# Patient Record
Sex: FEMALE | Race: BLACK OR AFRICAN AMERICAN | NOT HISPANIC OR LATINO | URBAN - METROPOLITAN AREA
[De-identification: names, ages, dates, MRNs, and addresses within clinical notes are randomized per-mention and may not be internally consistent; named-entity substitution may affect disease eponyms.]

---

## 2021-04-21 ENCOUNTER — OFFICE VISIT (OUTPATIENT)
Dept: OBSTETRICS AND GYNECOLOGY | Facility: CLINIC | Age: 26
End: 2021-04-21

## 2021-04-21 VITALS
DIASTOLIC BLOOD PRESSURE: 64 MMHG | BODY MASS INDEX: 25.3 KG/M2 | SYSTOLIC BLOOD PRESSURE: 118 MMHG | HEIGHT: 64 IN | WEIGHT: 148.2 LBS

## 2021-04-21 DIAGNOSIS — B37.9 YEAST INFECTION: ICD-10-CM

## 2021-04-21 DIAGNOSIS — N89.8 VAGINAL IRRITATION: ICD-10-CM

## 2021-04-21 DIAGNOSIS — Z11.3 SCREENING FOR STDS (SEXUALLY TRANSMITTED DISEASES): Primary | ICD-10-CM

## 2021-04-21 LAB
KOH PREP NAIL: ABNORMAL
WET PREP GENITAL: NORMAL

## 2021-04-21 PROCEDURE — 87210 SMEAR WET MOUNT SALINE/INK: CPT | Performed by: NURSE PRACTITIONER

## 2021-04-21 PROCEDURE — 87220 TISSUE EXAM FOR FUNGI: CPT | Performed by: NURSE PRACTITIONER

## 2021-04-21 PROCEDURE — 99213 OFFICE O/P EST LOW 20 MIN: CPT | Performed by: NURSE PRACTITIONER

## 2021-04-21 RX ORDER — FLUCONAZOLE 150 MG/1
TABLET ORAL
Qty: 2 TABLET | Refills: 0 | Status: SHIPPED | OUTPATIENT
Start: 2021-04-21 | End: 2022-12-01

## 2021-04-21 RX ORDER — VALACYCLOVIR HYDROCHLORIDE 1 G/1
1000 TABLET, FILM COATED ORAL DAILY
Qty: 30 TABLET | Refills: 6 | Status: SHIPPED | OUTPATIENT
Start: 2021-04-21 | End: 2022-12-01

## 2021-04-21 NOTE — PROGRESS NOTES
Chief Complaint   Patient presents with   • STD screening       Subjective   HPI  Marie Overton is a 26 y.o. female, , who presents for STD screening.    She reports that she is currently having some mild vaginal irritation. She reports that she recently had intercourse, and the condom broke.      She denies abnormal vaginal discharge,odor, or soaps/detergants/lotions.       Her last LMP was No LMP recorded (lmp unknown). Patient has had an implant..  Periods are absent, secondary to nexplanon. Partner Status: Marital Status: single.  New Partners since last visit: yes.  Desires STD Screening: yes.    She would also like refills on valacyclovir.    Additional OB/GYN History   Current contraception: contraceptive methods: Nexplanon  Desires to: continue contraception  Last Pap : 2020  Last Completed Pap Smear       Status Date      PAP SMEAR No completions recorded        History of abnormal Pap smear: yes - HR HPV Non 16/18  Tobacco Usage?: No   OB History        0    Para   0    Term   0       0    AB   0    Living   0       SAB   0    TAB   0    Ectopic   0    Molar   0    Multiple   0    Live Births   0                Health Maintenance   Topic Date Due   • Annual Gynecologic Pelvic and Breast Exam  Never done   • ANNUAL PHYSICAL  Never done   • Pneumococcal Vaccine 0-64 (1 of 1 - PPSV23) Never done   • HPV VACCINES (1 - 2-dose series) Never done   • COVID-19 Vaccine (1) Never done   • TDAP/TD VACCINES (1 - Tdap) Never done   • HEPATITIS C SCREENING  Never done   • CHLAMYDIA SCREENING  Never done   • PAP SMEAR  Never done   • INFLUENZA VACCINE  2021       The additional following portions of the patient's history were reviewed and updated as appropriate: allergies, current medications, past family history, past medical history, past social history, past surgical history and problem list.    Review of Systems   Constitutional: Negative.    HENT: Negative.    Respiratory: Negative.   "  Cardiovascular: Negative.    Gastrointestinal: Negative.    Genitourinary:        Vag irritation   Musculoskeletal: Negative.    Skin: Negative.    Allergic/Immunologic: Negative.    Neurological: Negative.    Hematological: Negative.    Psychiatric/Behavioral: Negative.        I have reviewed and agree with the HPI, ROS, and historical information as entered above. Paloma Lei, APRN    Objective   /64   Ht 162.6 cm (64\")   Wt 67.2 kg (148 lb 3.2 oz)   LMP  (LMP Unknown)   Breastfeeding No   BMI 25.44 kg/m²     Physical Exam  Vitals and nursing note reviewed. Exam conducted with a chaperone present.   Constitutional:       Appearance: Normal appearance.   HENT:      Head: Normocephalic and atraumatic.   Pulmonary:      Effort: Pulmonary effort is normal.   Genitourinary:     Labia:         Right: No rash, tenderness or lesion.         Left: No rash, tenderness or lesion.       Vagina: Normal. No lesions.      Cervix: Discharge (white, thick) present. No cervical motion tenderness, lesion or cervical bleeding.      Uterus: Normal. Not enlarged, not fixed and not tender.       Adnexa:         Right: No mass or tenderness.          Left: No mass or tenderness.        Rectum: No external hemorrhoid.      Comments: Chaperone Present  Neurological:      Mental Status: She is alert and oriented to person, place, and time.   Psychiatric:         Behavior: Behavior normal.         Assessment/Plan     Assessment     Problem List Items Addressed This Visit     None      Visit Diagnoses     Screening for STDs (sexually transmitted diseases)    -  Primary    Relevant Orders    Chlamydia trachomatis, Neisseria gonorrhoeae, Trichomonas vaginalis, PCR - Swab, Cervix    Yeast infection        Relevant Medications    fluconazole (Diflucan) 150 MG tablet    Vaginal irritation        Relevant Orders    POC Wet Prep (Completed)    POC KOH Prep (Completed)          Plan     1. Wet prep +yeast, Rx diflucan. STD cultures sent. " Enc condom use. Will call with results. Call if symptoms persist.       Paloma Lei, APRN  04/21/2021

## 2021-04-23 LAB
C TRACH RRNA SPEC QL NAA+PROBE: NEGATIVE
N GONORRHOEA RRNA SPEC QL NAA+PROBE: NEGATIVE
T VAGINALIS DNA SPEC QL NAA+PROBE: NEGATIVE

## 2021-11-08 ENCOUNTER — OFFICE VISIT (OUTPATIENT)
Dept: OBSTETRICS AND GYNECOLOGY | Facility: CLINIC | Age: 26
End: 2021-11-08

## 2021-11-08 VITALS
DIASTOLIC BLOOD PRESSURE: 62 MMHG | BODY MASS INDEX: 25.54 KG/M2 | HEIGHT: 64 IN | SYSTOLIC BLOOD PRESSURE: 122 MMHG | WEIGHT: 149.6 LBS

## 2021-11-08 DIAGNOSIS — N89.8 VAGINAL DISCHARGE: ICD-10-CM

## 2021-11-08 DIAGNOSIS — Z11.3 SCREENING FOR STD (SEXUALLY TRANSMITTED DISEASE): Primary | ICD-10-CM

## 2021-11-08 LAB
KOH PREP NAIL: NORMAL
WET PREP GENITAL: NORMAL

## 2021-11-08 PROCEDURE — 87220 TISSUE EXAM FOR FUNGI: CPT | Performed by: NURSE PRACTITIONER

## 2021-11-08 PROCEDURE — 87210 SMEAR WET MOUNT SALINE/INK: CPT | Performed by: NURSE PRACTITIONER

## 2021-11-08 PROCEDURE — 99213 OFFICE O/P EST LOW 20 MIN: CPT | Performed by: NURSE PRACTITIONER

## 2021-11-08 RX ORDER — FLUCONAZOLE 150 MG/1
150 TABLET ORAL AS NEEDED
Qty: 2 TABLET | Refills: 0 | Status: SHIPPED | OUTPATIENT
Start: 2021-11-08 | End: 2022-12-01

## 2021-11-08 RX ORDER — METRONIDAZOLE 7.5 MG/G
GEL VAGINAL DAILY
Qty: 70 G | Refills: 0 | Status: SHIPPED | OUTPATIENT
Start: 2021-11-08 | End: 2021-11-13

## 2021-11-08 NOTE — PROGRESS NOTES
"Chief Complaint   Patient presents with   • Follow-up     STD testing          Subjective   HPI  Marie Overton is a 26 y.o. female, , who presents for screening for STD's.     Her last LMP was Patient's last menstrual period was 2021 (exact date).. She  denies knowledge of risky exposure. The patient reports vaginal discharge.  She describes this as white and thick, vulvar itching, vulvar burning and vaginal odor.  She describes this as consistent with body odor. These symptoms have been present for 2 week(s). She is requesting STD testing without blood work. The patient reports a past history of: herpes..      Additional OB/GYN History   Last Pap :   Last Completed Pap Smear     This patient has no relevant Health Maintenance data.        History of abnormal Pap smear: no  OB History        0    Para   0    Term   0       0    AB   0    Living   0       SAB   0    IAB   0    Ectopic   0    Molar   0    Multiple   0    Live Births   0                The additional following portions of the patient's history were reviewed and updated as appropriate: allergies, current medications, past family history, past medical history, past social history, past surgical history and problem list.    Review of Systems   Constitutional: Negative.    Gastrointestinal: Negative.    Genitourinary: Positive for vaginal discharge.     All other systems reviewed and are negative.     I have reviewed and agree with the HPI, ROS, and historical information as entered above. Julieta Coburn, APRN    Objective   /62   Ht 162.6 cm (64\")   Wt 67.9 kg (149 lb 9.6 oz)   LMP 2021 (Exact Date)   Breastfeeding No   BMI 25.68 kg/m²     Physical Exam  Vitals and nursing note reviewed. Exam conducted with a chaperone present.   Constitutional:       Appearance: Normal appearance.   Pulmonary:      Effort: Pulmonary effort is normal.   Abdominal:      Palpations: Abdomen is soft.   Genitourinary:     Labia:  "        Right: No rash, tenderness or lesion.         Left: No rash, tenderness or lesion.       Vagina: Vaginal discharge present. No lesions.      Cervix: No cervical motion tenderness, discharge, lesion or cervical bleeding.      Uterus: Normal. Not enlarged, not fixed and not tender.       Adnexa:         Right: No mass or tenderness.          Left: No mass or tenderness.        Rectum: No external hemorrhoid.      Comments: Chaperone Present  Neurological:      Mental Status: She is alert.         Wet mount performed? Yes. 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 See orders for STD cultures and assays  2. Treat for BV with metrogel and prophylactic diflucan.   3. Advised condom use        Julieta Coburn, APRN  11/08/2021

## 2022-12-01 ENCOUNTER — TELEPHONE (OUTPATIENT)
Dept: OBSTETRICS AND GYNECOLOGY | Facility: CLINIC | Age: 27
End: 2022-12-01

## 2022-12-01 ENCOUNTER — OFFICE VISIT (OUTPATIENT)
Dept: OBSTETRICS AND GYNECOLOGY | Facility: CLINIC | Age: 27
End: 2022-12-01

## 2022-12-01 VITALS
HEIGHT: 64 IN | SYSTOLIC BLOOD PRESSURE: 126 MMHG | WEIGHT: 186 LBS | BODY MASS INDEX: 31.76 KG/M2 | DIASTOLIC BLOOD PRESSURE: 74 MMHG

## 2022-12-01 DIAGNOSIS — Z30.46 NEXPLANON REMOVAL: ICD-10-CM

## 2022-12-01 DIAGNOSIS — Z01.419 WOMEN'S ANNUAL ROUTINE GYNECOLOGICAL EXAMINATION: Primary | ICD-10-CM

## 2022-12-01 PROBLEM — F19.11 HISTORY OF SUBSTANCE ABUSE (HCC): Status: ACTIVE | Noted: 2022-12-01

## 2022-12-01 PROCEDURE — 99395 PREV VISIT EST AGE 18-39: CPT | Performed by: NURSE PRACTITIONER

## 2022-12-01 PROCEDURE — 11982 REMOVE DRUG IMPLANT DEVICE: CPT | Performed by: NURSE PRACTITIONER

## 2022-12-01 RX ORDER — BUPRENORPHINE HYDROCHLORIDE AND NALOXONE HYDROCHLORIDE DIHYDRATE 8; 2 MG/1; MG/1
TABLET SUBLINGUAL
COMMUNITY
Start: 2022-11-10 | End: 2023-03-24

## 2022-12-01 NOTE — TELEPHONE ENCOUNTER
Caller: JOHANN    Relationship: SELF    Best call back number: [phone]  What is the best time to reach you: ANYTIME     Who are you requesting to speak with (clinical staff, provider,  specific staff member): YUE LYONS     Do you know the name of the person who called: YUE     What was the call regarding: YUE LEFT VM ABOUT A MEDICATION SHE WANTED TO DISCUSS     Do you require a callback: YES.

## 2022-12-01 NOTE — PROGRESS NOTES
Gynecologic Annual Exam Note        Gynecologic Exam        Subjective     HPI  Marie Overton is a 27 y.o.  female who presents for annual well woman exam as a established patient. There were no changes to her medical or surgical history since her last visit.. Patient reports problems with: none. Patient's last menstrual period was 2022 (approximate).. Her periods are regular every 25-35 days, lasting 4-5 days. The flow is moderate. Dysmenorrhea:moderate, occurring premenstrually and first 1-2 days of flow. . Partner Status: Marital Status: single.  She is sexually active. She has not had new partners.. STD testing recommendations have been explained to the patient and she does desire STD testing.    Additional OB/GYN History   Current contraception: contraceptive methods: Nexplanon  Desires to: stop contraception  Thromboembolic Disease: none    History of STD: yes HSV    Last Pap : 20. Results: ASCUS. HPV: non 16/18+  Last Completed Pap Smear     This patient has no relevant Health Maintenance data.           History of abnormal Pap smear: yes -   Gardasil status:completed  Family history of uterine, colon, breast, or ovarian cancer: no  Performs monthly Self-Breast Exam: yes  Exercises Regularly:no  Feelings of Anxiety or Depression: no  Tobacco Usage?: No       Current Outpatient Medications:   •  buprenorphine-naloxone (SUBOXONE) 8-2 MG per SL tablet, , Disp: , Rfl:   •  Etonogestrel (NEXPLANON) 68 MG implant subdermal implant, Inject 1 each into the skin 1 (One) Time., Disp: , Rfl:      Patient denies the need for medication refills today.    OB History        0    Para   0    Term   0       0    AB   0    Living   0       SAB   0    IAB   0    Ectopic   0    Molar   0    Multiple   0    Live Births   0                Health Maintenance   Topic Date Due   • Annual Gynecologic Pelvic and Breast Exam  Never done   • ANNUAL PHYSICAL  Never done   • Pneumococcal Vaccine  "0-64 (1 - PCV) Never done   • HPV VACCINES (1 - 2-dose series) Never done   • TDAP/TD VACCINES (1 - Tdap) Never done   • HEPATITIS C SCREENING  Never done   • PAP SMEAR  05/20/2021   • COVID-19 Vaccine (3 - Booster for Pfizer series) 10/05/2021   • INFLUENZA VACCINE  Never done   • CHLAMYDIA SCREENING  Discontinued       Past Medical History:   Diagnosis Date   • Depression    • Herpes         Past Surgical History:   Procedure Laterality Date   • APPENDECTOMY         The additional following portions of the patient's history were reviewed and updated as appropriate: allergies, current medications, past family history, past medical history, past social history and past surgical history.    Review of Systems   Constitutional: Negative.    Respiratory: Negative.    Cardiovascular: Negative.    Gastrointestinal: Negative.    Genitourinary: Negative.          I have reviewed and agree with the HPI, ROS, and historical information as entered above. Julieta Coburn, APRN        Objective   /74 (BP Location: Right arm, Patient Position: Sitting, Cuff Size: Adult)   Ht 162.6 cm (64.02\")   Wt 84.4 kg (186 lb)   LMP 11/24/2022 (Approximate)   BMI 31.91 kg/m²     Physical Exam  Vitals and nursing note reviewed. Exam conducted with a chaperone present.   Constitutional:       Appearance: She is well-developed.   HENT:      Head: Normocephalic and atraumatic.   Neck:      Thyroid: No thyroid mass or thyromegaly.   Cardiovascular:      Heart sounds: No murmur heard.  Pulmonary:      Effort: Pulmonary effort is normal. No retractions.      Breath sounds: No wheezing, rhonchi or rales.   Chest:      Chest wall: No mass or tenderness.   Breasts:     Right: Normal. No mass, nipple discharge, skin change or tenderness.      Left: Normal. No mass, nipple discharge, skin change or tenderness.   Abdominal:      Palpations: Abdomen is soft. Abdomen is not rigid. There is no mass.      Tenderness: There is no abdominal " tenderness. There is no guarding.      Hernia: No hernia is present. There is no hernia in the left inguinal area.   Genitourinary:     Labia:         Right: No rash, tenderness or lesion.         Left: No rash, tenderness or lesion.       Vagina: Normal. No vaginal discharge or lesions.      Cervix: No cervical motion tenderness, discharge, lesion or cervical bleeding.      Uterus: Normal. Not enlarged, not fixed and not tender.       Adnexa:         Right: No mass or tenderness.          Left: No mass or tenderness.        Rectum: No external hemorrhoid.   Musculoskeletal:      Cervical back: Normal range of motion. No muscular tenderness.   Neurological:      Mental Status: She is alert and oriented to person, place, and time.   Psychiatric:         Behavior: Behavior normal.            Assessment and Plan    Problem List Items Addressed This Visit    None  Visit Diagnoses     Women's annual routine gynecological examination    -  Primary    Relevant Orders    LIQUID-BASED PAP SMEAR, [facility] ([facility])    Nexplanon removal            Likely plan to TTC. Discussed subutex risk/benefits with patient. She feels she may be able to wean down off of that and possibly D/C. She will discuss this with her prescribing provider. 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 Advised to start PNV. Can take up to 3 months for menses to regulate after discontinuing nexplanon.     1. GYN annual well woman exam.   2. Reviewed pap guidelines.   3. Reviewed monthly self breast exams.  Instructed to call with lumps, pain, or breast discharge.    4. RTC in 1 year or PRN with problems      Julieta Coburn, APRN  12/01/2022

## 2022-12-01 NOTE — PROGRESS NOTES
Procedure: Nexplanon removal    Procedures    Pre Dx: 1) Nexplanon removal  Post Dx: 1) Nexplanon removal   The risks, benefits, and alternatives to removal and reinsertion of the device have been discussed with the patient at length. All of her questions are answered. She is aware of the need for alternative means of contraception if desired. Verbal informed consent is obtained.    Able to easily palpate the device in the  Left arm. Additional imaging was not required. The device feels freely mobile and easily accessible. She was placed in the examining table in a supine position with her arm flexed at the elbow and hand under her head. The skin over this site is prepped with Betadine. 2-3 mL of 1% lidocaine with epinephrine was injected.    Downward pressure was applied on the proximal end of the implant nearest the axilla, and a 2-3 mm incision was made in a longitudinal direction of the arm at the tip of the implant closest to the elbow. The implant was then pushed gently toward the incision until the tip was visible. The fibrous capsule was opened with blunt dissection using a hemostat. The implant was grasp with a hemostat and was easily removed intact. It measured a  full 4 cm in length.    Steristrip was placed over incision site as well as a pressure dressing.     Tolerated well  No apparent complications  She was advised to call or return for complications such as fever, signs of infection, increased pain, or any other concerns.  Advised to start PNV, she will discuss subutex dosing with her prescriber. 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     Warning signs, limitations and expectations reviewed.     Julieta Coburn, APRN  12/01/2022

## 2022-12-02 ENCOUNTER — TELEPHONE (OUTPATIENT)
Dept: OBSTETRICS AND GYNECOLOGY | Facility: CLINIC | Age: 27
End: 2022-12-02

## 2022-12-02 NOTE — TELEPHONE ENCOUNTER
Spoke with Mary Anne LYONS. Since the patient has never been on the pill before Mary Anne would like her to be seen to start OCPs. Patient scheduled with Julieta Coburn 12/28/22 at 1:30pm.

## 2022-12-02 NOTE — TELEPHONE ENCOUNTER
Spoke with patient. She states she had her Nexplanon taken out yesterday and had planned to not be on any birth control, but has changed her mind. She would like to start OCPs. States she has had the Nexplanon for 10 years. She has never been on the pill. Told pt I will discuss with a provider and call her back. Pt APRIL.

## 2022-12-02 NOTE — TELEPHONE ENCOUNTER
Patient called and was seen yesterday and had her nexplanon taking out and now she wants to be pt on a birth control pill

## 2022-12-09 LAB — REF LAB TEST METHOD: NORMAL

## 2022-12-28 ENCOUNTER — OFFICE VISIT (OUTPATIENT)
Dept: OBSTETRICS AND GYNECOLOGY | Facility: CLINIC | Age: 27
End: 2022-12-28

## 2022-12-28 VITALS
HEIGHT: 64 IN | DIASTOLIC BLOOD PRESSURE: 78 MMHG | BODY MASS INDEX: 31.07 KG/M2 | SYSTOLIC BLOOD PRESSURE: 122 MMHG | WEIGHT: 182 LBS

## 2022-12-28 DIAGNOSIS — Z30.9 ENCOUNTER FOR CONTRACEPTIVE MANAGEMENT, UNSPECIFIED TYPE: Primary | ICD-10-CM

## 2022-12-28 LAB
B-HCG UR QL: NEGATIVE
EXPIRATION DATE: NORMAL
INTERNAL NEGATIVE CONTROL: NORMAL
INTERNAL POSITIVE CONTROL: NORMAL
Lab: NORMAL

## 2022-12-28 PROCEDURE — 81025 URINE PREGNANCY TEST: CPT | Performed by: NURSE PRACTITIONER

## 2022-12-28 PROCEDURE — 99212 OFFICE O/P EST SF 10 MIN: CPT | Performed by: NURSE PRACTITIONER

## 2022-12-28 NOTE — PROGRESS NOTES
Chief Complaint   Patient presents with   • Contraception     Start BC         Subjective   HPI  Marie Overton is a 27 y.o. female, , LMP was on Patient's last menstrual period was 2022 (exact date). who presents for requests birth control. She has had a Nexplanon in the past. She did do well with this in the past. She got Nexplanon out on 22.     The patient's current method of contraception is contraceptive methods: None. She is not satisfied with her current method of birth control due to wedding coming up. She would like to discuss OCP for birth control. The patient reports additional symptoms as none.     Pt states she had unprotected IC two days ago. UPT is negative today.     She has not had a period since she got her Nexplanon out. When she did have her Nexplanon in, her periods were once a month, lasting 4 days, flow moderate. . She reports dysmenorrhea is mild, occurring first 1-2 days of flow.     Partner Status: Marital Status: engaged. She is sexually active. She has not had new partners.    Additional OB/GYN History   Thromboembolic Disease: none  History of hypertension: no  History of migraines: none  Tobacco Usage?: Pt vapes nicotine    Last Pap : 22 ASC with HPV +, colpo scheduled in 2023  Last Completed Pap Smear          PAP SMEAR (Yearly) Next due on 2022  LIQUID-BASED PAP SMEAR, [facility] ([facility])    2020  Done - [place]- ASCUS-HPV non 16/18 positive              History of abnormal Pap smear: yes - 22      Current Outpatient Medications:   •  buprenorphine-naloxone (SUBOXONE) 8-2 MG per SL tablet, , Disp: , Rfl:   •  Etonogestrel (NEXPLANON) 68 MG implant subdermal implant, Inject 1 each into the skin 1 (One) Time., Disp: , Rfl:      Past Medical History:   Diagnosis Date   • Depression    • Herpes         Past Surgical History:   Procedure Laterality Date   • APPENDECTOMY         The additional following portions of the  "patient's history were reviewed and updated as appropriate: allergies and current medications.    Review of Systems   Constitutional: Negative.    Respiratory: Negative.    Cardiovascular: Negative.    Gastrointestinal: Negative.    Genitourinary: Negative.        I have reviewed and agree with the HPI, ROS, and historical information as entered above. Julieta Coburn, APRN    Objective   /78   Ht 162.6 cm (64\")   Wt 82.6 kg (182 lb)   LMP 11/24/2022 (Exact Date)   BMI 31.24 kg/m²     Physical Exam  Constitutional:       Appearance: Normal appearance.   Pulmonary:      Effort: Pulmonary effort is normal.   Neurological:      Mental Status: She is alert.         Assessment & Plan     Assessment     Problem List Items Addressed This Visit    None  Visit Diagnoses     Encounter for contraceptive management, unspecified type    -  Primary    Relevant Orders    POC Pregnancy, Urine (Completed)    HCG, B-subunit, Quantitative        Has decided to wait on trying to conceive until after her wedding. Nexplanon removed 12/1/22. Unprotected IC 12/26/22. UPT in office today neg    1. Counseling on use of oral contraceptives provided. Wants a pill for now.  2. Use condoms for now. RTO 1/10 after 2 weeks without unprotected IC for HCG and then we can send in MIO. If she starts menses prior to that then can take UPT and if negative we can go ahead and send it in.       Julieta Coburn, APRN  12/28/2022  "

## 2023-01-30 ENCOUNTER — TELEPHONE (OUTPATIENT)
Dept: OBSTETRICS AND GYNECOLOGY | Facility: CLINIC | Age: 28
End: 2023-01-30
Payer: MEDICAID

## 2023-01-30 NOTE — TELEPHONE ENCOUNTER
PT calling because she has a colposcopy scheduled for 2/28 but just found out she is pregnant. Wants to know if she needs to cancel that appt. Also unsure of exact lmp.

## 2023-01-30 NOTE — TELEPHONE ENCOUNTER
Per Dr. Heath: will repeat pap smear at PP visit.     S/w pt she v/u and states she needs a NOB appt scheduled and colposcopy cancelled.     colpo has been cancelled and I told the patient I would have someone give her a call tomorrow to schedule NOB she v/u

## 2023-01-30 NOTE — TELEPHONE ENCOUNTER
Dr. Heath pt.     S/w pt she states she has colposcopy scheduled for 2/28/23 with Dr. Heath due to pap smear results being ASCUS and HPV pool + on 12/1/22.     Patient states her LMP was 12/25/22 and she had a + UPT : yesterday (1/29/22)    Patient wanted to know if we are still wanting to do the colposcopy or if she needs to cancel it.     I told patient I would discuss this with Dr. Heath and CB with plan of care. She v/u

## 2023-02-23 ENCOUNTER — INITIAL PRENATAL (OUTPATIENT)
Dept: OBSTETRICS AND GYNECOLOGY | Facility: CLINIC | Age: 28
End: 2023-02-23
Payer: MEDICAID

## 2023-02-23 VITALS — DIASTOLIC BLOOD PRESSURE: 72 MMHG | WEIGHT: 182 LBS | SYSTOLIC BLOOD PRESSURE: 118 MMHG | BODY MASS INDEX: 31.24 KG/M2

## 2023-02-23 DIAGNOSIS — A60.09 GENITAL HERPES AFFECTING PREGNANCY, ANTEPARTUM: ICD-10-CM

## 2023-02-23 DIAGNOSIS — O99.330 TOBACCO USE DURING PREGNANCY, ANTEPARTUM: ICD-10-CM

## 2023-02-23 DIAGNOSIS — F19.11 HISTORY OF SUBSTANCE ABUSE: ICD-10-CM

## 2023-02-23 DIAGNOSIS — Z3A.08 8 WEEKS GESTATION OF PREGNANCY: ICD-10-CM

## 2023-02-23 DIAGNOSIS — O98.319 GENITAL HERPES AFFECTING PREGNANCY, ANTEPARTUM: ICD-10-CM

## 2023-02-23 DIAGNOSIS — Z36.9 ENCOUNTER FOR ANTENATAL SCREENING: Primary | ICD-10-CM

## 2023-02-23 PROBLEM — Z34.90 PREGNANCY: Status: ACTIVE | Noted: 2023-02-23

## 2023-02-23 PROCEDURE — 99214 OFFICE O/P EST MOD 30 MIN: CPT | Performed by: OBSTETRICS & GYNECOLOGY

## 2023-02-23 RX ORDER — CHOLECALCIFEROL (VITAMIN D3) 25 MCG
1 TABLET,CHEWABLE ORAL DAILY
Qty: 90 CAPSULE | Refills: 3 | Status: SHIPPED | OUTPATIENT
Start: 2023-02-23 | End: 2023-03-24

## 2023-02-23 NOTE — PROGRESS NOTES
Initial ob visit     CC- Here for care of pregnancy        Marie Overton is a 28 y.o. female, , who presents for her first obstetrical visit.  Her last LMP was Patient's last menstrual period was 2022. Her HERB is 10/3/2023, by Last Menstrual Period. Current GA is 8w2d.     Initial positive test date : 1/10/2023, UPT        Her periods are: every 4 weeks  Prior obstetric issues: none  Patient's past medical history is significant for: hx of substance abuse, currently on suboxone through [facility] in [place]. Pt meets with clinic every 3 weeks and has been discussing taper therapy and transition to monthly treatment injections (medication is Sublocade) with therapist at suboxone clinic.  Family history of genetic issues (includes FOB): none  Prior infections concerning in pregnancy (Rash, fever in last 2 weeks): No  Varicella Hx - unknown   Prior testing for Cystic Fibrosis Carrier or Sickle Cell Trait- patient denies testing  Prepregnancy BMI - Body mass index is 31.24 kg/m².  History of STD: yes HSV genital, last outbreak 6-7 years ago  Hx of HSV for patient or partner: yes - see above  Ultrasound Today: yes    OB History    Para Term  AB Living   1 0 0 0 0 0   SAB IAB Ectopic Molar Multiple Live Births   0 0 0 0 0 0      # Outcome Date GA Lbr Jose/2nd Weight Sex Delivery Anes PTL Lv   1 Current                Additional Pertinent History   Last Pap : 22 Result: ASCUS HPV: HPV pool +     Last Completed Pap Smear          PAP SMEAR (Yearly) Next due on 2022  LIQUID-BASED PAP SMEAR, [facility] ([facility])    2020  Done - [place]- ASCUS-HPV non 16/18 positive              History of abnormal Pap smear: yes - ASCUS and HPV pool+ on 22  Family history of uterine, colon, breast, or ovarian cancer: no  Feelings of Anxiety or Depression: no  Tobacco Usage?: Yes Marie Overton  reports that she has been smoking cigarettes. She has never used smokeless  tobacco.. I have educated her on the risk of diseases from using tobacco products such as cancer, COPD, heart disease and low birth weight.     I advised her to quit and she is willing to quit. We have discussed the following method/s for tobacco cessation:  Education Material Counseling OTC Cessation Products.        Alcohol/Drug Use?: YES suboxone prescribed through treatment program  Over the age of 35 at delivery: no  Desires Genetic Screening: Cell Free DNA, will contact insurance to see about coverage  Flu Status: Declines    PMH    Current Outpatient Medications:   •  buprenorphine-naloxone (SUBOXONE) 8-2 MG per SL tablet, , Disp: , Rfl:   •  Prenatal MV & Min w/FA-DHA (PRENATAL ADULT GUMMY/DHA/FA PO), Take  by mouth., Disp: , Rfl:   •  Prenatal Vit-Fe Sulfate-FA-DHA (Prenatal Vitamin/Min +DHA) 27-0.8-200 MG capsule, Take 1 tablet by mouth Daily., Disp: 90 capsule, Rfl: 3     Past Medical History:   Diagnosis Date   • Depression    • Herpes    • History of substance abuse (HCC)     sober for 6 months        Past Surgical History:   Procedure Laterality Date   • APPENDECTOMY         Review of Systems   Review of Systems  Patient Reports: Nausea, Cramping and Fatigue  Patient Denies: Spotting, Heavy bleeding and vomiting  All systems reviewed and otherwise normal.    I have reviewed and agree with the HPI, ROS, and historical information as entered above. Tamiko Heath MD    /72   Wt 82.6 kg (182 lb)   LMP 12/27/2022   BMI 31.24 kg/m²     The additional following portions of the patient's history were reviewed and updated as appropriate: allergies, current medications, past family history, past medical history, past social history, past surgical history and problem list.    Physical Exam  General:  well developed; well nourished  no acute distress   Chest/Respiratory: No labored breathing, normal respiratory effort, normal appearance, no respiratory noises noted   Heart:  not examined   Thyroid: not  examined   Breasts:  Not performed.   Abdomen: soft, non-tender; no masses  no umbilical or inguinal hernias are present  no hepato-splenomegaly   Pelvis: Not performed.        Assessment and Plan    Problem List Items Addressed This Visit        Gynecologic and Obstetric Problems    Genital herpes affecting pregnancy, antepartum    Overview     supp 36 wks            Other    History of substance abuse (HCC)    Overview     On suboxone at [facility]. Has been sober x 6months. [facility] counseling  [facility]         Pregnancy    Overview     Plan PDC adolfo US.          Tobacco use during pregnancy, antepartum   Other Visit Diagnoses     Encounter for  screening    -  Primary    Relevant Orders    Obstetric Panel    HIV-1 / O / 2 Ag / Antibody 4th Generation    Urine Culture - Urine, Urine, Clean Catch    Chlamydia trachomatis, Neisseria gonorrhoeae, PCR - Urine, Urine, Random Void    Urine Drug Screen - Urine, Clean Catch          1. Pregnancy at 8w2d  2. Reviewed routine prenatal care with the office and educational materials given  3. Lab(s) Ordered  4. Discussed options for genetic testing including first trimester nuchal translucency screen, genetic disease carrier testing, quadruple screen, and NIPT  5. Patient is on Prenatal vitamins  6. Activity recommendation : 150 minutes/week of moderate intensity aerobic activity unless we limit for bleeding, hypertension or other pregnancy complication   7. rec smoking cessation   8. She is in MAT, clinic here in [place], and seeing therapistl. She has hopes to wean.  Return in about 1 month (around 3/23/2023) for F/U Prenatal.      Tamiko Heath MD  2023

## 2023-02-25 LAB
ABO GROUP BLD: NORMAL
AMPHETAMINES UR QL SCN: NEGATIVE NG/ML
BACTERIA UR CULT: NO GROWTH
BACTERIA UR CULT: NORMAL
BARBITURATES UR QL SCN: NEGATIVE NG/ML
BASOPHILS # BLD AUTO: 0.1 X10E3/UL (ref 0–0.2)
BASOPHILS NFR BLD AUTO: 1 %
BENZODIAZ UR QL SCN: NEGATIVE NG/ML
BLD GP AB SCN SERPL QL: NEGATIVE
BZE UR QL SCN: NEGATIVE NG/ML
C TRACH RRNA SPEC QL NAA+PROBE: NEGATIVE
CANNABINOIDS UR QL SCN: NEGATIVE NG/ML
CREAT UR-MCNC: 189.5 MG/DL (ref 20–300)
EOSINOPHIL # BLD AUTO: 0 X10E3/UL (ref 0–0.4)
EOSINOPHIL NFR BLD AUTO: 0 %
ERYTHROCYTE [DISTWIDTH] IN BLOOD BY AUTOMATED COUNT: 13.1 % (ref 11.7–15.4)
HBV SURFACE AG SERPL QL IA: NEGATIVE
HCT VFR BLD AUTO: 39 % (ref 34–46.6)
HCV IGG SERPL QL IA: NON REACTIVE
HGB BLD-MCNC: 12.6 G/DL (ref 11.1–15.9)
HIV 1+2 AB+HIV1 P24 AG SERPL QL IA: NON REACTIVE
IMM GRANULOCYTES # BLD AUTO: 0 X10E3/UL (ref 0–0.1)
IMM GRANULOCYTES NFR BLD AUTO: 0 %
LABORATORY COMMENT REPORT: NORMAL
LYMPHOCYTES # BLD AUTO: 2.1 X10E3/UL (ref 0.7–3.1)
LYMPHOCYTES NFR BLD AUTO: 24 %
MCH RBC QN AUTO: 29.4 PG (ref 26.6–33)
MCHC RBC AUTO-ENTMCNC: 32.3 G/DL (ref 31.5–35.7)
MCV RBC AUTO: 91 FL (ref 79–97)
METHADONE UR QL SCN: NEGATIVE NG/ML
MONOCYTES # BLD AUTO: 0.6 X10E3/UL (ref 0.1–0.9)
MONOCYTES NFR BLD AUTO: 7 %
N GONORRHOEA RRNA SPEC QL NAA+PROBE: NEGATIVE
NEUTROPHILS # BLD AUTO: 5.9 X10E3/UL (ref 1.4–7)
NEUTROPHILS NFR BLD AUTO: 68 %
OPIATES UR QL SCN: NEGATIVE NG/ML
OXYCODONE+OXYMORPHONE UR QL SCN: NEGATIVE NG/ML
PCP UR QL: NEGATIVE NG/ML
PH UR: 6.4 [PH] (ref 4.5–8.9)
PLATELET # BLD AUTO: 263 X10E3/UL (ref 150–450)
PROPOXYPH UR QL SCN: NEGATIVE NG/ML
RBC # BLD AUTO: 4.29 X10E6/UL (ref 3.77–5.28)
RH BLD: POSITIVE
RPR SER QL: NON REACTIVE
RUBV IGG SERPL IA-ACNC: 5.52 INDEX
WBC # BLD AUTO: 8.7 X10E3/UL (ref 3.4–10.8)

## 2023-02-27 ENCOUNTER — TELEPHONE (OUTPATIENT)
Dept: OBSTETRICS AND GYNECOLOGY | Facility: CLINIC | Age: 28
End: 2023-02-27
Payer: MEDICAID

## 2023-02-27 NOTE — TELEPHONE ENCOUNTER
lvom to let patient know she can come in as early as 9 weeks to get labs done and it takes approximately 7-10 business days to come back but most of the time it does not take that long.

## 2023-02-27 NOTE — TELEPHONE ENCOUNTER
Caller: JOHANN SHAFER    Relationship to patient: SELF    Best call back number: [phone]    Patient is needing: PATIENT WOULD LIKE TO GET THE BLOODWORK TO FIND OUT THE GENDER OF HER BABY. SHE ALSO WANTED TO KNOW HOW LONG THE RESULTS TAKE TO GET BACK. PLEASE ADVISE PATIENT ON SCHEDULING.

## 2023-02-28 ENCOUNTER — LAB (OUTPATIENT)
Dept: OBSTETRICS AND GYNECOLOGY | Facility: CLINIC | Age: 28
End: 2023-02-28
Payer: MEDICAID

## 2023-02-28 DIAGNOSIS — Z34.91 PRENATAL CARE IN FIRST TRIMESTER: Primary | ICD-10-CM

## 2023-03-08 ENCOUNTER — TELEPHONE (OUTPATIENT)
Dept: OBSTETRICS AND GYNECOLOGY | Facility: CLINIC | Age: 28
End: 2023-03-08
Payer: MEDICAID

## 2023-03-09 ENCOUNTER — TELEPHONE (OUTPATIENT)
Dept: OBSTETRICS AND GYNECOLOGY | Facility: CLINIC | Age: 28
End: 2023-03-09
Payer: MEDICAID

## 2023-03-09 NOTE — TELEPHONE ENCOUNTER
Unable to resend results to [employer], it appears it was released at 230pm today. Will call back in 10 min to review results with pt and friend.

## 2023-03-24 ENCOUNTER — ROUTINE PRENATAL (OUTPATIENT)
Dept: OBSTETRICS AND GYNECOLOGY | Facility: CLINIC | Age: 28
End: 2023-03-24
Payer: MEDICAID

## 2023-03-24 VITALS — SYSTOLIC BLOOD PRESSURE: 110 MMHG | BODY MASS INDEX: 30.97 KG/M2 | DIASTOLIC BLOOD PRESSURE: 70 MMHG | WEIGHT: 180.4 LBS

## 2023-03-24 DIAGNOSIS — Z3A.12 12 WEEKS GESTATION OF PREGNANCY: Primary | ICD-10-CM

## 2023-03-24 RX ORDER — PNV NO.95/FERROUS FUM/FOLIC AC 28MG-0.8MG
1 TABLET ORAL DAILY
COMMUNITY
Start: 2023-03-01

## 2023-03-24 RX ORDER — KETOCONAZOLE 20 MG/ML
SHAMPOO TOPICAL
COMMUNITY
Start: 2023-03-01

## 2023-03-24 RX ORDER — BUPRENORPHINE HYDROCHLORIDE 8 MG/1
TABLET SUBLINGUAL
COMMUNITY
Start: 2023-03-16

## 2023-03-24 NOTE — PROGRESS NOTES
OB FOLLOW UP  CC- Here for care of pregnancy        Marie Overton is a 28 y.o.  12w3d patient being seen today for her obstetrical follow up visit. Patient reports occasional cramping and headaches.  cfdna neg/boy.  She smokes e-cig and has decreased use.  She was changed from Suboxone to Subutex approx. 1.5weeks ago.  NOB labs wnl    Her prenatal care is complicated by (and status) :   Patient Active Problem List   Diagnosis   • History of substance abuse (HCC)   • Pregnancy   • Genital herpes affecting pregnancy, antepartum   • Tobacco use during pregnancy, antepartum         Flu Status: may get elsewhere   Ultrasound Today: No    ROS -     Patient Denies: Loss of Fluid, Vaginal Spotting, Vision Changes, Nausea  and Vomiting   All other systems reviewed and are negative.     The additional following portions of the patient's history were reviewed and updated as appropriate: allergies, current medications, past family history, past medical history, past social history, past surgical history and problem list.    I have reviewed and agree with the HPI, ROS, and historical information as entered above. Mary Anne Renee, APRN    /70   Wt 81.8 kg (180 lb 6.4 oz)   LMP 2022   BMI 30.97 kg/m²         EXAM:     Prenatal Vitals  BP: 110/70  Weight: 81.8 kg (180 lb 6.4 oz)   Fetal Heart Rate: +                  Assessment and Plan    Problem List Items Addressed This Visit        Gravid and     Pregnancy - Primary    Overview     Plan PDC adolfo US.             1. Pregnancy at 12w3d  2. Labs reviewed from New OB Visit.  3. Counseled on genetic testing, carrier status and option for NT screen  4. Activity and Exercise discussed.  5. Patient is on Prenatal vitamins  Return in about 4 weeks (around 2023).    Mary Anne Renee, APRN  2023

## 2023-04-21 ENCOUNTER — ROUTINE PRENATAL (OUTPATIENT)
Dept: OBSTETRICS AND GYNECOLOGY | Facility: CLINIC | Age: 28
End: 2023-04-21
Payer: MEDICAID

## 2023-04-21 VITALS — WEIGHT: 184 LBS | DIASTOLIC BLOOD PRESSURE: 76 MMHG | SYSTOLIC BLOOD PRESSURE: 116 MMHG | BODY MASS INDEX: 31.58 KG/M2

## 2023-04-21 DIAGNOSIS — A60.09 GENITAL HERPES AFFECTING PREGNANCY, ANTEPARTUM: ICD-10-CM

## 2023-04-21 DIAGNOSIS — Z34.02 FIRST PREGNANCY, SECOND TRIMESTER: Primary | ICD-10-CM

## 2023-04-21 DIAGNOSIS — O98.319 GENITAL HERPES AFFECTING PREGNANCY, ANTEPARTUM: ICD-10-CM

## 2023-04-21 DIAGNOSIS — F19.11 HISTORY OF SUBSTANCE ABUSE: ICD-10-CM

## 2023-04-21 DIAGNOSIS — O99.330 TOBACCO USE DURING PREGNANCY, ANTEPARTUM: ICD-10-CM

## 2023-04-21 LAB
GLUCOSE UR STRIP-MCNC: NEGATIVE MG/DL
PROT UR STRIP-MCNC: NEGATIVE MG/DL

## 2023-04-21 NOTE — PROGRESS NOTES
OB FOLLOW UP  CC- Here for care of pregnancy        Marie Overton is a 28 y.o.  16w3d patient being seen today for her obstetrical follow up visit. Patient reports having heartburn that is resolved with TUMS.     Her prenatal care is complicated by (and status) :   Patient Active Problem List   Diagnosis   • History of substance abuse   • Pregnancy   • Genital herpes affecting pregnancy, antepartum   • Tobacco use during pregnancy, antepartum          Ultrasound Today: No    AFP: desires    ROS -   Patient Reports : Patient reports having heartburn that is resolved with TUMS.   Patient Denies: Loss of Fluid, Vaginal Spotting, Vision Changes, Headaches, Nausea , Vomiting , Contractions and Epigastric pain  Fetal Movement : absent-too early  All other systems reviewed and are negative.       The additional following portions of the patient's history were reviewed and updated as appropriate: allergies, current medications, past family history, past medical history, past social history, past surgical history and problem list.    I have reviewed and agree with the HPI, ROS, and historical information as entered above. Mary Anne Renee, APRN        EXAM:     Prenatal Vitals  BP: 116/76  Weight: 83.5 kg (184 lb)   Fetal Heart Rate: +   Pelvic Exam:        Urine Glucose Read-only: Negative  Urine Protein Read-only: Negative           Assessment and Plan    Problem List Items Addressed This Visit        Mental Health    History of substance abuse    Overview     On suboxone at [facility]. Has been sober x 6months. In counseling  Seeing [facility] for MAT            Tobacco    Tobacco use during pregnancy, antepartum       Other    Genital herpes affecting pregnancy, antepartum    Overview     supp 36 wks        Other Visit Diagnoses     First pregnancy, second trimester    -  Primary    Relevant Orders    Alpha Fetoprotein, Maternal    POC Urinalysis Dipstick (Completed)    US Ob 14 + Weeks Single or First Gestation           1. Pregnancy at 16w3d  2. Fetal status reassuring.   3. Counseled on MSAFP alone in relation to OTD and placental issues.  Drawn today  4. Anatomy scan next visit.   5. Activity and Exercise discussed.  6. Patient is on Prenatal vitamins  Return in about 4 weeks (around 5/19/2023) for anatomy US.    Mary Anne Renee, APRN  04/21/2023

## 2023-04-26 LAB
AFP INTERP SERPL-IMP: NORMAL
AFP INTERP SERPL-IMP: NORMAL
AFP MOM SERPL: 1.05
AFP SERPL-MCNC: 37.5 NG/ML
AGE AT DELIVERY: 26.6 YR
GA METHOD: NORMAL
GA: 16.4 WEEKS
IDDM PATIENT QL: NO
LABORATORY COMMENT REPORT: NORMAL
MULTIPLE PREGNANCY: NO
NEURAL TUBE DEFECT RISK FETUS: NORMAL %
RESULT: NORMAL

## 2023-05-23 ENCOUNTER — ROUTINE PRENATAL (OUTPATIENT)
Dept: OBSTETRICS AND GYNECOLOGY | Facility: CLINIC | Age: 28
End: 2023-05-23
Payer: MEDICAID

## 2023-05-23 VITALS — SYSTOLIC BLOOD PRESSURE: 120 MMHG | DIASTOLIC BLOOD PRESSURE: 80 MMHG | BODY MASS INDEX: 32.82 KG/M2 | WEIGHT: 191.2 LBS

## 2023-05-23 DIAGNOSIS — Z34.02 ENCOUNTER FOR SUPERVISION OF NORMAL FIRST PREGNANCY IN SECOND TRIMESTER: Primary | ICD-10-CM

## 2023-05-23 LAB
GLUCOSE UR STRIP-MCNC: NEGATIVE MG/DL
PROT UR STRIP-MCNC: NEGATIVE MG/DL

## 2023-05-31 ENCOUNTER — REFERRAL TRIAGE (OUTPATIENT)
Dept: LABOR AND DELIVERY | Facility: HOSPITAL | Age: 28
End: 2023-05-31

## 2023-06-12 ENCOUNTER — PATIENT OUTREACH (OUTPATIENT)
Dept: LABOR AND DELIVERY | Facility: HOSPITAL | Age: 28
End: 2023-06-12
Payer: MEDICAID

## 2023-06-12 NOTE — OUTREACH NOTE
[facility]  Unable to Reach       Questions/Answers    Flowsheet Row Responses   Pending Outreach Confirm Patient Interest   Call Attempt First   Outcome No answer/busy, [facility] message sent to patient   Unable to reach comments: mailbox full          Confirmation of interest letter sent via [employer] message.    VIRI Fulton RN  Maternity Nurse Navigator    6/12/2023, 10:08 EDT

## 2023-06-13 ENCOUNTER — PATIENT OUTREACH (OUTPATIENT)
Dept: LABOR AND DELIVERY | Facility: HOSPITAL | Age: 28
End: 2023-06-13
Payer: MEDICAID

## 2023-06-13 RX ORDER — VALACYCLOVIR HYDROCHLORIDE 1 G/1
1000 TABLET, FILM COATED ORAL 2 TIMES DAILY
Qty: 30 TABLET | Refills: 2 | Status: SHIPPED | OUTPATIENT
Start: 2023-06-13

## 2023-06-13 NOTE — OUTREACH NOTE
[facility]  Enrollment    Current Estimated Gestational Age: 24w0d    Questions/Answers    Flowsheet Row Responses   Would like to participate? Yes   Date of Intake Visit 06/22/23          [facility] questionnaire sent via [facility]. Contact information provided.    VIRI Fulton RN  Maternity Nurse Navigator    6/13/2023, 17:53 EDT

## 2023-06-19 DIAGNOSIS — Z34.02 FIRST PREGNANCY, SECOND TRIMESTER: Primary | ICD-10-CM

## 2023-06-19 DIAGNOSIS — Z36.2 ENCOUNTER FOR FOLLOW-UP ULTRASOUND OF FETAL ANATOMY: ICD-10-CM

## 2023-07-25 ENCOUNTER — ROUTINE PRENATAL (OUTPATIENT)
Dept: OBSTETRICS AND GYNECOLOGY | Facility: CLINIC | Age: 28
End: 2023-07-25
Payer: MEDICAID

## 2023-07-25 VITALS — DIASTOLIC BLOOD PRESSURE: 60 MMHG | BODY MASS INDEX: 34.88 KG/M2 | SYSTOLIC BLOOD PRESSURE: 116 MMHG | WEIGHT: 203.2 LBS

## 2023-07-25 DIAGNOSIS — O98.319 GENITAL HERPES AFFECTING PREGNANCY, ANTEPARTUM: ICD-10-CM

## 2023-07-25 DIAGNOSIS — F19.11 HISTORY OF SUBSTANCE ABUSE: ICD-10-CM

## 2023-07-25 DIAGNOSIS — O99.330 TOBACCO USE DURING PREGNANCY, ANTEPARTUM: ICD-10-CM

## 2023-07-25 DIAGNOSIS — Z3A.30 30 WEEKS GESTATION OF PREGNANCY: ICD-10-CM

## 2023-07-25 DIAGNOSIS — Z34.93 PRENATAL CARE IN THIRD TRIMESTER: Primary | ICD-10-CM

## 2023-07-25 DIAGNOSIS — A60.09 GENITAL HERPES AFFECTING PREGNANCY, ANTEPARTUM: ICD-10-CM

## 2023-07-25 LAB
GLUCOSE UR STRIP-MCNC: NEGATIVE MG/DL
PROT UR STRIP-MCNC: ABNORMAL MG/DL

## 2023-07-25 RX ORDER — VALACYCLOVIR HYDROCHLORIDE 1 G/1
1000 TABLET, FILM COATED ORAL 2 TIMES DAILY
Qty: 30 TABLET | Refills: 2 | OUTPATIENT
Start: 2023-07-25

## 2023-07-25 RX ORDER — VALACYCLOVIR HYDROCHLORIDE 1 G/1
1000 TABLET, FILM COATED ORAL 2 TIMES DAILY
Qty: 30 TABLET | Refills: 2 | Status: SHIPPED | OUTPATIENT
Start: 2023-07-25 | End: 2023-08-01

## 2023-07-25 NOTE — PROGRESS NOTES
OB FOLLOW UP  CC- Here for care of pregnancy        Marie Overton is a 28 y.o.  30w0d patient being seen today for her obstetrical follow up visit. Patient reports occasional mary sewell. She reports poison ivy on inner thighs, she went to [facility] yesterday and given an ointment.     Her prenatal care is complicated by (and status) :  None  Patient Active Problem List   Diagnosis    History of substance abuse    Pregnancy    Genital herpes affecting pregnancy, antepartum    Tobacco use during pregnancy, antepartum       TDAP status: received at last visit  Rhogam status: was not indicated  28 week labs: Reviewed  Ultrasound Today: No  Non Stress Test: No.      ROS -   Patient Reports :  See above  Patient Denies: Loss of Fluid, Vaginal Spotting, Vision Changes, Headaches, Contractions, and Epigastric pain  Fetal Movement : normal  All other systems reviewed and are negative.       The additional following portions of the patient's history were reviewed and updated as appropriate: allergies, current medications, past family history, past medical history, past social history, past surgical history, and problem list.    I have reviewed and agree with the HPI, ROS, and historical information as entered above. Tamiko Heath MD      /60   Wt 92.2 kg (203 lb 3.2 oz)   LMP 2022   BMI 34.88 kg/m²         EXAM:     Prenatal Vitals  BP: 116/60  Weight: 92.2 kg (203 lb 3.2 oz)   Fetal Heart Rate: +      Fundal Height (cm): 30 cm        Urine Glucose Read-only: Negative  Urine Protein Read-only: (!) Trace           Assessment and Plan    Problem List Items Addressed This Visit          Gynecologic and Obstetric Problems    Genital herpes affecting pregnancy, antepartum    Overview     supp 36 wks         Relevant Medications    valACYclovir (VALTREX) 1000 MG tablet       Other    History of substance abuse    Overview     On suboxone at [facility]. Has been sober x 6months. In counseling  Seeing [facility]  for MAT         Pregnancy    Overview     Limited profile views, repeat US 32 wks         Tobacco use during pregnancy, antepartum    Overview     QUIT          Other Visit Diagnoses       Prenatal care in third trimester    -  Primary    Relevant Orders    POC Urinalysis Dipstick (Completed)    US Ob Follow Up Transabdominal Approach            Pregnancy at 30w0d  Fetal status reassuring.  28 week labs reviewed.    Activity and Exercise discussed.  Fetal movement/PTL or Labor precautions  U/S ordered at follow up  Return in about 2 weeks (around 8/8/2023) for F/U Prenatal, U/S Next Visit.    Tamiko Heath MD  07/25/2023

## 2023-07-26 RX ORDER — VALACYCLOVIR HYDROCHLORIDE 1 G/1
1000 TABLET, FILM COATED ORAL 2 TIMES DAILY
Qty: 30 TABLET | Refills: 2 | OUTPATIENT
Start: 2023-07-26 | End: 2023-08-02

## 2023-08-07 ENCOUNTER — ROUTINE PRENATAL (OUTPATIENT)
Dept: OBSTETRICS AND GYNECOLOGY | Facility: CLINIC | Age: 28
End: 2023-08-07
Payer: MEDICAID

## 2023-08-07 VITALS — BODY MASS INDEX: 35.29 KG/M2 | WEIGHT: 205.6 LBS | DIASTOLIC BLOOD PRESSURE: 72 MMHG | SYSTOLIC BLOOD PRESSURE: 128 MMHG

## 2023-08-07 DIAGNOSIS — Z34.93 PRENATAL CARE IN THIRD TRIMESTER: Primary | ICD-10-CM

## 2023-08-07 DIAGNOSIS — F19.11 HISTORY OF SUBSTANCE ABUSE: ICD-10-CM

## 2023-08-07 DIAGNOSIS — O98.319 GENITAL HERPES AFFECTING PREGNANCY, ANTEPARTUM: ICD-10-CM

## 2023-08-07 DIAGNOSIS — Z3A.32 32 WEEKS GESTATION OF PREGNANCY: ICD-10-CM

## 2023-08-07 DIAGNOSIS — A60.09 GENITAL HERPES AFFECTING PREGNANCY, ANTEPARTUM: ICD-10-CM

## 2023-08-07 DIAGNOSIS — O99.330 TOBACCO USE DURING PREGNANCY, ANTEPARTUM: ICD-10-CM

## 2023-08-07 LAB
GLUCOSE UR STRIP-MCNC: NEGATIVE MG/DL
PROT UR STRIP-MCNC: NEGATIVE MG/DL

## 2023-08-07 PROCEDURE — 99213 OFFICE O/P EST LOW 20 MIN: CPT | Performed by: OBSTETRICS & GYNECOLOGY

## 2023-08-07 NOTE — PROGRESS NOTES
OB FOLLOW UP  CC- Here for care of pregnancy        Marie Overton is a 28 y.o.  31w6d patient being seen today for her obstetrical follow up visit. Patient reports increased white vaginal discharge for the past month. She states that she feels like labia has changed in color due to discharge and pregnancy. She denies any vaginal itching or irritation.     Her prenatal care is complicated by (and status) :  None  Patient Active Problem List   Diagnosis    History of substance abuse    Pregnancy    Genital herpes affecting pregnancy, antepartum    Tobacco use during pregnancy, antepartum       TDAP status: received at last visit  Rhogam status: was not indicated  28 week labs: Reviewed  Ultrasound Today: Yes  Non Stress Test: No.      ROS -   Patient Reports :  See above  Patient Denies: Loss of Fluid, Vaginal Spotting, Vision Changes, Headaches, Contractions, and Epigastric pain  Fetal Movement : normal  All other systems reviewed and are negative.       The additional following portions of the patient's history were reviewed and updated as appropriate: allergies, current medications, past family history, past medical history, past social history, past surgical history, and problem list.    I have reviewed and agree with the HPI, ROS, and historical information as entered above. Tamiko Heath MD      /72   Wt 93.3 kg (205 lb 9.6 oz)   LMP 2022   BMI 35.29 kg/mý         EXAM:     Prenatal Vitals  BP: 128/72  Weight: 93.3 kg (205 lb 9.6 oz)   Fetal Heart Rate: +               Urine Glucose Read-only: Negative  Urine Protein Read-only: Negative           Assessment and Plan    Problem List Items Addressed This Visit          Gynecologic and Obstetric Problems    Genital herpes affecting pregnancy, antepartum    Overview     supp 36 wks            Other    History of substance abuse    Overview     On suboxone at [facility]. Has been sober x 6months. In counseling  [facility]          Pregnancy    Overview     EFW 31%ile 32 wks         Tobacco use during pregnancy, antepartum    Overview     QUIT          Other Visit Diagnoses       Prenatal care in third trimester    -  Primary    Relevant Orders    US OB Follow Up Transabdominal Approach    POC Urinalysis Dipstick (Completed)            Pregnancy at 31w6d. US today normal growth and fluid.   Fetal status reassuring.  28 week labs reviewed.    Activity and Exercise discussed.  Fetal movement/PTL or Labor precautions  Return in about 2 weeks (around 8/21/2023) for F/U Prenatal.    Tamiko Heath MD  08/07/2023

## 2023-08-21 ENCOUNTER — ROUTINE PRENATAL (OUTPATIENT)
Dept: OBSTETRICS AND GYNECOLOGY | Facility: CLINIC | Age: 28
End: 2023-08-21
Payer: MEDICAID

## 2023-08-21 VITALS — BODY MASS INDEX: 35.74 KG/M2 | WEIGHT: 208.2 LBS | DIASTOLIC BLOOD PRESSURE: 62 MMHG | SYSTOLIC BLOOD PRESSURE: 132 MMHG

## 2023-08-21 DIAGNOSIS — O98.319 GENITAL HERPES AFFECTING PREGNANCY, ANTEPARTUM: ICD-10-CM

## 2023-08-21 DIAGNOSIS — O99.330 TOBACCO USE DURING PREGNANCY, ANTEPARTUM: ICD-10-CM

## 2023-08-21 DIAGNOSIS — A60.09 GENITAL HERPES AFFECTING PREGNANCY, ANTEPARTUM: ICD-10-CM

## 2023-08-21 DIAGNOSIS — Z34.93 PRENATAL CARE IN THIRD TRIMESTER: Primary | ICD-10-CM

## 2023-08-21 DIAGNOSIS — F19.11 HISTORY OF SUBSTANCE ABUSE: ICD-10-CM

## 2023-08-21 DIAGNOSIS — Z3A.33 33 WEEKS GESTATION OF PREGNANCY: ICD-10-CM

## 2023-08-21 LAB
GLUCOSE UR STRIP-MCNC: NEGATIVE MG/DL
PROT UR STRIP-MCNC: NEGATIVE MG/DL

## 2023-08-21 PROCEDURE — 99213 OFFICE O/P EST LOW 20 MIN: CPT | Performed by: OBSTETRICS & GYNECOLOGY

## 2023-08-21 RX ORDER — PNV NO.95/FERROUS FUM/FOLIC AC 28MG-0.8MG
TABLET ORAL DAILY
COMMUNITY

## 2023-08-21 NOTE — PROGRESS NOTES
OB FOLLOW UP  CC- Here for care of pregnancy        Marie Overton is a 28 y.o.  33w6d patient being seen today for her obstetrical follow up visit. Patient reports swelling in both lower extremities (It is Pitting), vaginal pressure/pain, and pelvic cramping.     Her prenatal care is complicated by (and status) :   Patient Active Problem List   Diagnosis    History of substance abuse    Pregnancy    Genital herpes affecting pregnancy, antepartum    Tobacco use during pregnancy, antepartum       Ultrasound Today: No  Non Stress Test: No    ROS -   Patient Reports :  see above  Patient Denies: Loss of Fluid, Vaginal Spotting, Vision Changes, Headaches, Nausea , Vomiting , Contractions, and Epigastric pain  Fetal Movement : normal  All other systems reviewed and are negative.       The additional following portions of the patient's history were reviewed and updated as appropriate: allergies and current medications.    I have reviewed and agree with the HPI, ROS, and historical information as entered above. Tamiko Haeth MD      /62   Wt 94.4 kg (208 lb 3.2 oz)   LMP 2022   BMI 35.74 kg/mý       EXAM:     Prenatal Vitals  BP: 132/62  Weight: 94.4 kg (208 lb 3.2 oz)   Fetal Heart Rate: +               Urine Glucose Read-only: Negative  Urine Protein Read-only: Negative           Assessment and Plan    Problem List Items Addressed This Visit          Gynecologic and Obstetric Problems    Genital herpes affecting pregnancy, antepartum    Overview     supp 36 wks            Other    History of substance abuse    Overview     On suboxone at [facility]. Has been sober x 6months. In counseling  Seeing [facility] for MAT         Pregnancy    Overview     EFW 31%ile 32 wks         Tobacco use during pregnancy, antepartum    Overview     QUIT          Other Visit Diagnoses       Prenatal care in third trimester    -  Primary    Relevant Orders    POC Urinalysis Dipstick (Completed)            Pregnancy at  33w6d  Fetal status reassuring.   Activity and Exercise discussed.  Fetal movement/PTL or Labor precautions  GBS next visit  Return in about 2 weeks (around 9/4/2023) for F/U Prenatal.    Tamiko Heath MD  08/21/2023

## 2023-08-29 ENCOUNTER — PATIENT OUTREACH (OUTPATIENT)
Dept: LABOR AND DELIVERY | Facility: HOSPITAL | Age: 28
End: 2023-08-29
Payer: MEDICAID

## 2023-08-29 NOTE — OUTREACH NOTE
[facility]  Check-In    Current Estimated Gestational Age: 35w0d      Questions/Answers      Flowsheet Row Responses   Best Method for Contacting Cell   Demographics Reviewed Yes   Currently Employed No   Able to keep appointments as scheduled Yes   Baby Active/Feeling Fetal Movemen Yes   Are you having any of the following symptoms? Contractions  [cramping and mary sewell]   Questions regarding prenatal visits or tests to be ordered? No   May I ask you questions about your substance use? Yes   Special Considerations Birthing Ball, Peanut Ball   Supplies ready for baby Car Seat, Clothing, Crib, Diapers, Feeding Supplies   Resource/Environmental Concerns None   Do you have any questions related to your care experience, your pregnancy, plans for delivery, any concerns, etc? No   Other Education Birth Plan, Insurance benefits/Incentives, Meds to Beds, Mental Health Services, Substance Use Disorder Treatment, SNAP Benefits, WIC Benefits            Marie is feeling well and reports good fetal movement. Discussed what to expect when she goes in to L&D including admission process, comfort measures, positioning, pain medication/Stadol, epidural placement, delivery, skin to skin, breastfeeding, post-delivery, and MB care. Discussed potential for extended stay for baby related to maternal treatment with Subutex. VU.    She is feeling ready for delivery. She has enrolled in [employer] and feels like she has everything she will need for baby.    Updated resources provided via [employer] message. Encouraged to look at both the [employer] message and in the Visits tab for educational items pertaining to her pregnancy in the AVS. Contact information provided. Encouraged to call with questions, concerns, or for support. Will plan to see inpatient after delivery.    Referral submitted to the following resources (verbal consent received to submit demographic information):     TROY Fulton RN  Maternity Nurse  Navigator    8/29/2023, 13:31 EDT

## 2023-09-07 ENCOUNTER — ROUTINE PRENATAL (OUTPATIENT)
Dept: OBSTETRICS AND GYNECOLOGY | Facility: CLINIC | Age: 28
End: 2023-09-07
Payer: MEDICAID

## 2023-09-07 ENCOUNTER — LAB (OUTPATIENT)
Dept: LAB | Facility: HOSPITAL | Age: 28
End: 2023-09-07
Payer: MEDICAID

## 2023-09-07 VITALS — WEIGHT: 212 LBS | DIASTOLIC BLOOD PRESSURE: 72 MMHG | BODY MASS INDEX: 36.39 KG/M2 | SYSTOLIC BLOOD PRESSURE: 148 MMHG

## 2023-09-07 DIAGNOSIS — Z34.93 PRENATAL CARE IN THIRD TRIMESTER: ICD-10-CM

## 2023-09-07 DIAGNOSIS — R03.0 ELEVATED BLOOD PRESSURE READING: ICD-10-CM

## 2023-09-07 DIAGNOSIS — A60.09 GENITAL HERPES AFFECTING PREGNANCY, ANTEPARTUM: ICD-10-CM

## 2023-09-07 DIAGNOSIS — Z34.93 THIRD TRIMESTER PREGNANCY: ICD-10-CM

## 2023-09-07 DIAGNOSIS — Z3A.36 36 WEEKS GESTATION OF PREGNANCY: Primary | ICD-10-CM

## 2023-09-07 DIAGNOSIS — F19.11 HISTORY OF SUBSTANCE ABUSE: ICD-10-CM

## 2023-09-07 DIAGNOSIS — O99.330 TOBACCO USE DURING PREGNANCY, ANTEPARTUM: ICD-10-CM

## 2023-09-07 DIAGNOSIS — O98.319 GENITAL HERPES AFFECTING PREGNANCY, ANTEPARTUM: ICD-10-CM

## 2023-09-07 LAB
EXPIRATION DATE: 0
GLUCOSE UR STRIP-MCNC: NEGATIVE MG/DL
Lab: 0
PROT UR STRIP-MCNC: ABNORMAL MG/DL

## 2023-09-07 PROCEDURE — 87081 CULTURE SCREEN ONLY: CPT

## 2023-09-07 RX ORDER — VALACYCLOVIR HYDROCHLORIDE 500 MG/1
500 TABLET, FILM COATED ORAL DAILY
Qty: 30 TABLET | Refills: 0 | Status: SHIPPED | OUTPATIENT
Start: 2023-09-07 | End: 2023-10-07

## 2023-09-07 NOTE — PROGRESS NOTES
OB FOLLOW UP  CC- Here for care of pregnancy        Marie Overton is a 28 y.o.  36w2d patient being seen today for her obstetrical follow up visit. Patient reports daily Juan Gilliam (5-10 times), trace BLE edema, and daily hiccups (occur the same time daily: 0100, 2100, and other evening times). Patient also reporting change in discharge; described as thin and yellowish in color without malodor.    Her prenatal care is complicated by (and status) :   Patient Active Problem List   Diagnosis    History of substance abuse    Pregnancy    Genital herpes affecting pregnancy, antepartum    Tobacco use during pregnancy, antepartum       GBS Status: Done Today. She is not allergic to PCN.    No Known Allergies     Flu Status:  not currently flu season  Her Delivery Plan is: Undecided; would like to think about it for longer    US today: no  Non Stress Test: Yes, 20+ minutes  non-stress test: NST: Reactive  indication: Hypertension        ROS -   Patient Reports :  see above  Patient Denies: Loss of Fluid, Vaginal Spotting, Vision Changes, Headaches, Nausea , Vomiting , and Epigastric pain  Fetal Movement : normal  All other systems reviewed and are negative.       The additional following portions of the patient's history were reviewed and updated as appropriate: allergies, current medications, past family history, past medical history, past social history, past surgical history, and problem list.    I have reviewed and agree with the HPI, ROS, and historical information as entered above. Tamiko Heath MD        EXAM:     Prenatal Vitals  BP: 148/72  Weight: 96.2 kg (212 lb)   Fetal Heart Rate: NST       Dilation/Effacement/Station  Dilation: Fingertip  Effacement (%): 50      Urine Glucose Read-only: Negative  Urine Protein Read-only: (!) 30 mg/dL           Assessment and Plan    Problem List Items Addressed This Visit          Gynecologic and Obstetric Problems    Genital herpes affecting pregnancy,  antepartum    Overview     supp 36 wks            Other    History of substance abuse    Overview     On suboxone at nob. Has been sober x 6months. In counseling  [facility]         Pregnancy - Primary    Overview     EFW 31%ile 32 wks         Relevant Orders    POC Glucose, Urine, Qualitative, Dipstick (Completed)    POC Protein, Urine, Qualitative, Dipstick (Completed)    Group B Streptococcus Culture - Swab, Vaginal/Rectum    Group B Streptococcus Culture - Swab, Vaginal/Rectum    Fetal Nonstress Test    Tobacco use during pregnancy, antepartum    Overview     QUIT          Other Visit Diagnoses       Prenatal care in third trimester        Relevant Orders    POC Glucose, Urine, Qualitative, Dipstick (Completed)    POC Protein, Urine, Qualitative, Dipstick (Completed)    Group B Streptococcus Culture - Swab, Vaginal/Rectum    Group B Streptococcus Culture - Swab, Vaginal/Rectum    Fetal Nonstress Test    Preeclampsia Panel    Elevated blood pressure reading        Relevant Orders    Fetal Nonstress Test            Pregnancy at 36w2d. Repeat /60. NST reactive. No symptoms. She will monitor her BP at home. Fu Monday for repeat BP check, PEP today.   Fetal status reassuring.   Reviewed Pre-eclampsia signs/symptoms  Delivery options reviewed with patient  Signs of labor reviewed  Kick counts reviewed  Activity and Exercise discussed.  Return in about 4 days (around 9/11/2023) for F/U Prenatal.    Tamiko Heath MD  09/07/2023

## 2023-09-08 LAB
ALT SERPL-CCNC: 15 IU/L (ref 0–32)
AST SERPL-CCNC: 18 IU/L (ref 0–40)
BASOPHILS # BLD AUTO: 0 X10E3/UL (ref 0–0.2)
BASOPHILS NFR BLD AUTO: 0 %
BUN SERPL-MCNC: 12 MG/DL (ref 6–20)
CREAT SERPL-MCNC: 0.81 MG/DL (ref 0.57–1)
CREAT UR-MCNC: ABNORMAL MG/DL
EGFRCR SERPLBLD CKD-EPI 2021: 103 ML/MIN/1.73
EOSINOPHIL # BLD AUTO: 0.1 X10E3/UL (ref 0–0.4)
EOSINOPHIL NFR BLD AUTO: 1 %
ERYTHROCYTE [DISTWIDTH] IN BLOOD BY AUTOMATED COUNT: 13.6 % (ref 11.7–15.4)
HCT VFR BLD AUTO: 39.2 % (ref 34–46.6)
HGB BLD-MCNC: 12.9 G/DL (ref 11.1–15.9)
IMM GRANULOCYTES # BLD AUTO: 0 X10E3/UL (ref 0–0.1)
IMM GRANULOCYTES NFR BLD AUTO: 0 %
LDH SERPL L TO P-CCNC: 173 IU/L (ref 119–226)
LYMPHOCYTES # BLD AUTO: 1.9 X10E3/UL (ref 0.7–3.1)
LYMPHOCYTES NFR BLD AUTO: 18 %
MCH RBC QN AUTO: 29.9 PG (ref 26.6–33)
MCHC RBC AUTO-ENTMCNC: 32.9 G/DL (ref 31.5–35.7)
MCV RBC AUTO: 91 FL (ref 79–97)
MICROALBUMIN UR-MCNC: ABNORMAL
MONOCYTES # BLD AUTO: 1.1 X10E3/UL (ref 0.1–0.9)
MONOCYTES NFR BLD AUTO: 10 %
NEUTROPHILS # BLD AUTO: 7.1 X10E3/UL (ref 1.4–7)
NEUTROPHILS NFR BLD AUTO: 71 %
PLATELET # BLD AUTO: 187 X10E3/UL (ref 150–450)
RBC # BLD AUTO: 4.32 X10E6/UL (ref 3.77–5.28)
URATE SERPL-MCNC: 4.8 MG/DL (ref 2.6–6.2)
WBC # BLD AUTO: 10.2 X10E3/UL (ref 3.4–10.8)

## 2023-09-09 LAB — BACTERIA SPEC AEROBE CULT: ABNORMAL

## 2023-09-11 ENCOUNTER — ROUTINE PRENATAL (OUTPATIENT)
Dept: OBSTETRICS AND GYNECOLOGY | Facility: CLINIC | Age: 28
End: 2023-09-11
Payer: MEDICAID

## 2023-09-11 VITALS — WEIGHT: 213.6 LBS | BODY MASS INDEX: 36.66 KG/M2 | SYSTOLIC BLOOD PRESSURE: 132 MMHG | DIASTOLIC BLOOD PRESSURE: 80 MMHG

## 2023-09-11 DIAGNOSIS — F19.11 HISTORY OF SUBSTANCE ABUSE: ICD-10-CM

## 2023-09-11 DIAGNOSIS — O13.3 PREGNANCY-INDUCED HYPERTENSION, THIRD TRIMESTER: ICD-10-CM

## 2023-09-11 DIAGNOSIS — O99.330 TOBACCO USE DURING PREGNANCY, ANTEPARTUM: ICD-10-CM

## 2023-09-11 DIAGNOSIS — Z34.93 PRENATAL CARE IN THIRD TRIMESTER: Primary | ICD-10-CM

## 2023-09-11 DIAGNOSIS — R03.0 ELEVATED BLOOD PRESSURE READING: ICD-10-CM

## 2023-09-11 LAB
EXPIRATION DATE: ABNORMAL
GLUCOSE UR STRIP-MCNC: NEGATIVE MG/DL
Lab: ABNORMAL
PROT UR STRIP-MCNC: ABNORMAL MG/DL

## 2023-09-11 NOTE — PROGRESS NOTES
OB FOLLOW UP  CC- Here for care of pregnancy        Marie Overton is a 28 y.o.  36w6d patient being seen today for her obstetrical follow up visit. Patient reports mild swelling in her BLE.  She also reports occasional mary sewell/contractions.     She denies LOF, vaginal spotting, headaches, vision changes. She reports adequate fetal movements, >10 movements in 10 hours.      Her prenatal care is complicated by (and status) :   Patient Active Problem List   Diagnosis    History of substance abuse    Pregnancy    Genital herpes affecting pregnancy, antepartum    Tobacco use during pregnancy, antepartum    Pregnancy-induced hypertension, third trimester       GBS Status:   Group B Strep Culture   Date Value Ref Range Status   2023 Streptococcus agalactiae (Group B) (A)  Final     Comment:       This organism is considered to be universally susceptible to penicillin.  No further antibiotic testing will be performed. If Clindamycin or Erythromycin is the drug of choice, notify the laboratory within 7 days to request susceptibility testing.         No Known Allergies     Her Delivery Plan is: Undecided    US today: no  Non Stress Test: Yes minutes >20  non-stress test:  equivocal  BPP   indication: Hypertension    ROS -   Patient Reports :  swelling and mary sewell/contractions  Patient Denies: Loss of Fluid, Vaginal Spotting, Vision Changes, Headaches, Nausea , Vomiting , and Epigastric pain  Fetal Movement : normal  All other systems reviewed and are negative.       The additional following portions of the patient's history were reviewed and updated as appropriate: allergies, current medications, past family history, past medical history, past social history, past surgical history, and problem list.    I have reviewed and agree with the HPI, ROS, and historical information as entered above. Mehdi Mendes, APRN        EXAM:     Prenatal Vitals  BP: 132/80  Weight: 96.9 kg (213 lb 9.6  oz)   Fetal Heart Rate:               Urine Glucose Read-only: Negative  Urine Protein Read-only: (!) Trace           Assessment and Plan    Problem List Items Addressed This Visit       History of substance abuse    Overview     On suboxone at nob. Has been sober x 6months. In counseling  Seeing [facility] for MAT         Tobacco use during pregnancy, antepartum    Overview     QUIT         Pregnancy-induced hypertension, third trimester     Other Visit Diagnoses       Prenatal care in third trimester    -  Primary    Relevant Orders    POC Protein, Urine, Qualitative, Dipstick (Completed)    POC Glucose, Urine, Qualitative, Dipstick (Completed)    US Fetal Biophysical Profile;With Non-Stress Testing    Elevated blood pressure reading                Pregnancy at 36w6d  Fetal status reassuring. NST equivocal, Bpp 8/8  Reviewed Pre-eclampsia signs/symptoms  Discussed IOL options with patient. Pt. desires IOL at 39 weeks.   Delivery options reviewed with patient  Signs of labor reviewed  Kick counts reviewed  Activity and Exercise discussed.  Return in about 3 days (around 9/14/2023) for NST.    Mehdi Mendes, APRN  09/11/2023

## 2023-09-15 ENCOUNTER — ROUTINE PRENATAL (OUTPATIENT)
Dept: OBSTETRICS AND GYNECOLOGY | Facility: CLINIC | Age: 28
End: 2023-09-15
Payer: MEDICAID

## 2023-09-15 VITALS — DIASTOLIC BLOOD PRESSURE: 78 MMHG | WEIGHT: 213 LBS | SYSTOLIC BLOOD PRESSURE: 124 MMHG | BODY MASS INDEX: 36.56 KG/M2

## 2023-09-15 DIAGNOSIS — O13.3 PREGNANCY-INDUCED HYPERTENSION, THIRD TRIMESTER: Primary | ICD-10-CM

## 2023-09-15 DIAGNOSIS — O99.330 TOBACCO USE DURING PREGNANCY, ANTEPARTUM: ICD-10-CM

## 2023-09-15 DIAGNOSIS — O98.319 GENITAL HERPES AFFECTING PREGNANCY, ANTEPARTUM: ICD-10-CM

## 2023-09-15 DIAGNOSIS — A60.09 GENITAL HERPES AFFECTING PREGNANCY, ANTEPARTUM: ICD-10-CM

## 2023-09-15 DIAGNOSIS — F19.11 HISTORY OF SUBSTANCE ABUSE: ICD-10-CM

## 2023-09-15 DIAGNOSIS — Z34.93 PRENATAL CARE IN THIRD TRIMESTER: ICD-10-CM

## 2023-09-15 DIAGNOSIS — Z3A.37 37 WEEKS GESTATION OF PREGNANCY: ICD-10-CM

## 2023-09-15 LAB
GLUCOSE UR STRIP-MCNC: NEGATIVE MG/DL
PROT UR STRIP-MCNC: ABNORMAL MG/DL

## 2023-09-15 NOTE — PROGRESS NOTES
OB FOLLOW UP  CC- Here for care of pregnancy        Marie Overton is a 28 y.o.  37w3d patient being seen today for her obstetrical follow up visit. Patient reports swelling in both lower extremities. It is Non-Pitting. She also reports occasional contractions and mild intermittent cramping.     Patient states that she went to her MAT doctor yesterday and b/p was elevated. Her b/p at home have been averaging 120's/80's. Patients b/p in office today was 124/78.    Her prenatal care is complicated by (and status) :  PIH  Patient Active Problem List   Diagnosis    History of substance abuse    Pregnancy    Genital herpes affecting pregnancy, antepartum    Tobacco use during pregnancy, antepartum       GBS Status:   Group B Strep Culture   Date Value Ref Range Status   2023 Streptococcus agalactiae (Group B) (A)  Final     Comment:       This organism is considered to be universally susceptible to penicillin.  No further antibiotic testing will be performed. If Clindamycin or Erythromycin is the drug of choice, notify the laboratory within 7 days to request susceptibility testing.         No Known Allergies       Her Delivery Plan is: Desires IOL at 39wks. Scheduled    US today: no  Non Stress Test: Yes minutes 20  non-stress test: NST: Reactive  indication:  elevated BP yesterday    ROS -   Patient Reports :  See above  Patient Denies: Loss of Fluid, Vaginal Spotting, Vision Changes, Headaches, and Epigastric pain  Fetal Movement : normal  All other systems reviewed and are negative.       The additional following portions of the patient's history were reviewed and updated as appropriate: allergies, current medications, past family history, past medical history, past social history, past surgical history, and problem list.    I have reviewed and agree with the HPI, ROS, and historical information as entered above. Tamiko Heath MD              EXAM:     Prenatal Vitals  BP: 124/78  Weight: 96.6 kg (213  lb)   Fetal Heart Rate: NST              Urine Glucose Read-only: Negative  Urine Protein Read-only: (!) Trace           Assessment and Plan    Problem List Items Addressed This Visit          Gynecologic and Obstetric Problems    Genital herpes affecting pregnancy, antepartum    Overview     supp 36 wks         Relevant Medications    valACYclovir (Valtrex) 500 MG tablet    RESOLVED: Pregnancy-induced hypertension, third trimester - Primary    Relevant Orders    Fetal Nonstress Test       Other    History of substance abuse    Overview     On suboxone at nob. Has been sober x 6months. In counseling  [facility]         Relevant Orders    Fetal Nonstress Test    Pregnancy    Overview     EFW 31%ile 32 wks         Tobacco use during pregnancy, antepartum    Overview     QUIT          Other Visit Diagnoses       Prenatal care in third trimester        Relevant Orders    POC Urinalysis Dipstick (Completed)            Pregnancy at 37w3d. NST today reactive. BP normal and she feels well. Her BPs have been normal at home. PEP was normal last week. She will cont to monitor closely and we will FU Monday for BP check. Discussed that if her BP is elevated again, we will need to move up her induction  Fetal status reassuring.   Reviewed Pre-eclampsia signs/symptoms  Delivery options reviewed with patient  Signs of labor reviewed  Kick counts reviewed  Activity and Exercise discussed.  Return in about 3 days (around 9/18/2023) for F/U Prenatal, NST Next Visit.    Tamiko Heath MD  09/15/2023

## 2023-09-18 ENCOUNTER — ROUTINE PRENATAL (OUTPATIENT)
Dept: OBSTETRICS AND GYNECOLOGY | Facility: CLINIC | Age: 28
End: 2023-09-18
Payer: MEDICAID

## 2023-09-18 VITALS — WEIGHT: 215.6 LBS | BODY MASS INDEX: 37.01 KG/M2 | DIASTOLIC BLOOD PRESSURE: 88 MMHG | SYSTOLIC BLOOD PRESSURE: 134 MMHG

## 2023-09-18 DIAGNOSIS — A60.09 GENITAL HERPES AFFECTING PREGNANCY, ANTEPARTUM: ICD-10-CM

## 2023-09-18 DIAGNOSIS — Z3A.37 37 WEEKS GESTATION OF PREGNANCY: ICD-10-CM

## 2023-09-18 DIAGNOSIS — O98.319 GENITAL HERPES AFFECTING PREGNANCY, ANTEPARTUM: ICD-10-CM

## 2023-09-18 DIAGNOSIS — Z34.03 ENCOUNTER FOR SUPERVISION OF NORMAL FIRST PREGNANCY IN THIRD TRIMESTER: Primary | ICD-10-CM

## 2023-09-18 DIAGNOSIS — F19.11 HISTORY OF SUBSTANCE ABUSE: ICD-10-CM

## 2023-09-18 LAB
GLUCOSE UR STRIP-MCNC: NEGATIVE MG/DL
PROT UR STRIP-MCNC: ABNORMAL MG/DL

## 2023-09-18 NOTE — PROGRESS NOTES
OB FOLLOW UP  CC- Here for care of pregnancy        Marie Overton is a 28 y.o.  37w6d patient being seen today for her obstetrical follow up visit. Patient reports swelling in lower legs/feet.  Last PEP on  as wnl. GBS positive.  She is taking Valtrex daily for suppression.     Her prenatal care is complicated by (and status) :   Patient Active Problem List   Diagnosis    History of substance abuse    Pregnancy    Genital herpes affecting pregnancy, antepartum    Tobacco use during pregnancy, antepartum       GBS Status:   Group B Strep Culture   Date Value Ref Range Status   2023 Streptococcus agalactiae (Group B) (A)  Final     Comment:       This organism is considered to be universally susceptible to penicillin.  No further antibiotic testing will be performed. If Clindamycin or Erythromycin is the drug of choice, notify the laboratory within 7 days to request susceptibility testing.         No Known Allergies       Her Delivery Plan is: Desires IOL at 39wks. Scheduled    US today: no  Non Stress Test: Yes minutes 20  Reactive  indication: Hypertension    ROS -     Patient Denies: Loss of Fluid, Vaginal Spotting, Vision Changes, Headaches, Nausea , Vomiting , Contractions, and Epigastric pain  Fetal Movement : normal  All other systems reviewed and are negative.       The additional following portions of the patient's history were reviewed and updated as appropriate: allergies, current medications, past family history, past medical history, past social history, past surgical history, and problem list.    I have reviewed and agree with the HPI, ROS, and historical information as entered above. Tamiko Heath MD        EXAM:     Prenatal Vitals  BP: 134/88  Weight: 97.8 kg (215 lb 9.6 oz)   Fetal Heart Rate: NST              Urine Glucose Read-only: Negative  Urine Protein Read-only: (!) 1+           Assessment and Plan    Problem List Items Addressed This Visit          Gynecologic and  Obstetric Problems    Genital herpes affecting pregnancy, antepartum    Overview     supp 36 wks         Relevant Medications    valACYclovir (Valtrex) 500 MG tablet    Other Relevant Orders    Fetal Nonstress Test       Other    History of substance abuse    Overview     On suboxone at nob. Has been sober x 6months. In counseling  [facility]ing [facility]         Relevant Orders    Fetal Nonstress Test    Pregnancy    Overview     EFW 31%ile 32 wks          Other Visit Diagnoses       Encounter for supervision of normal first pregnancy in third trimester    -  Primary    Relevant Orders    POC Urinalysis Dipstick (Completed)            Pregnancy at 37w6d. BP normal today. NST reactive. She feels well. Will repeat NST BP check on Friday. She will cont to monitor as well.   Fetal status reassuring.   Reviewed Pre-eclampsia signs/symptoms  Delivery options reviewed with patient  Signs of labor reviewed  Kick counts reviewed  Activity and Exercise discussed.  Return in about 4 days (around 9/22/2023) for F/U Prenatal, NST Next Visit.    Tamiko Heath MD  09/18/2023

## 2023-09-19 ENCOUNTER — TELEPHONE (OUTPATIENT)
Dept: OBSTETRICS AND GYNECOLOGY | Facility: CLINIC | Age: 28
End: 2023-09-19
Payer: MEDICAID

## 2023-09-19 NOTE — TELEPHONE ENCOUNTER
Patient informed IOL date change could be r/t Ivana's schedule. She has an upcoming appt stephanie/ Ivana 9/22/23. She will discuss with Ivana at that time.

## 2023-09-22 ENCOUNTER — PREP FOR SURGERY (OUTPATIENT)
Dept: OTHER | Facility: HOSPITAL | Age: 28
End: 2023-09-22
Payer: MEDICAID

## 2023-09-22 ENCOUNTER — ROUTINE PRENATAL (OUTPATIENT)
Dept: OBSTETRICS AND GYNECOLOGY | Facility: CLINIC | Age: 28
End: 2023-09-22
Payer: MEDICAID

## 2023-09-22 VITALS — DIASTOLIC BLOOD PRESSURE: 80 MMHG | WEIGHT: 216.6 LBS | BODY MASS INDEX: 37.18 KG/M2 | SYSTOLIC BLOOD PRESSURE: 128 MMHG

## 2023-09-22 DIAGNOSIS — O98.319 GENITAL HERPES AFFECTING PREGNANCY, ANTEPARTUM: ICD-10-CM

## 2023-09-22 DIAGNOSIS — O09.43 SUPERVISION OF HIGH-RISK PREGNANCY WITH GRAND MULTIPARITY IN THIRD TRIMESTER: Primary | ICD-10-CM

## 2023-09-22 DIAGNOSIS — Z3A.38 38 WEEKS GESTATION OF PREGNANCY: ICD-10-CM

## 2023-09-22 DIAGNOSIS — Z3A.39 39 WEEKS GESTATION OF PREGNANCY: Primary | ICD-10-CM

## 2023-09-22 DIAGNOSIS — A60.09 GENITAL HERPES AFFECTING PREGNANCY, ANTEPARTUM: ICD-10-CM

## 2023-09-22 DIAGNOSIS — F19.11 HISTORY OF SUBSTANCE ABUSE: ICD-10-CM

## 2023-09-22 LAB
GLUCOSE UR STRIP-MCNC: NEGATIVE MG/DL
PROT UR STRIP-MCNC: NEGATIVE MG/DL

## 2023-09-22 RX ORDER — MORPHINE SULFATE 2 MG/ML
2 INJECTION, SOLUTION INTRAMUSCULAR; INTRAVENOUS
OUTPATIENT
Start: 2023-09-22 | End: 2023-10-02

## 2023-09-22 RX ORDER — SODIUM CHLORIDE 0.9 % (FLUSH) 0.9 %
10 SYRINGE (ML) INJECTION EVERY 12 HOURS SCHEDULED
OUTPATIENT
Start: 2023-09-22

## 2023-09-22 RX ORDER — PROMETHAZINE HYDROCHLORIDE 12.5 MG/1
12.5 TABLET ORAL EVERY 6 HOURS PRN
OUTPATIENT
Start: 2023-09-22

## 2023-09-22 RX ORDER — SODIUM CHLORIDE 9 MG/ML
40 INJECTION, SOLUTION INTRAVENOUS AS NEEDED
OUTPATIENT
Start: 2023-09-22

## 2023-09-22 RX ORDER — OXYTOCIN/0.9 % SODIUM CHLORIDE 30/500 ML
999 PLASTIC BAG, INJECTION (ML) INTRAVENOUS ONCE
OUTPATIENT
Start: 2023-09-22 | End: 2023-09-22

## 2023-09-22 RX ORDER — SODIUM CHLORIDE 0.9 % (FLUSH) 0.9 %
10 SYRINGE (ML) INJECTION AS NEEDED
OUTPATIENT
Start: 2023-09-22

## 2023-09-22 RX ORDER — MAGNESIUM CARB/ALUMINUM HYDROX 105-160MG
30 TABLET,CHEWABLE ORAL ONCE
OUTPATIENT
Start: 2023-09-22 | End: 2023-09-22

## 2023-09-22 RX ORDER — PROMETHAZINE HYDROCHLORIDE 12.5 MG/1
12.5 SUPPOSITORY RECTAL EVERY 6 HOURS PRN
OUTPATIENT
Start: 2023-09-22

## 2023-09-22 RX ORDER — MISOPROSTOL 200 UG/1
800 TABLET ORAL AS NEEDED
OUTPATIENT
Start: 2023-09-22

## 2023-09-22 RX ORDER — METHYLERGONOVINE MALEATE 0.2 MG/ML
200 INJECTION INTRAVENOUS ONCE AS NEEDED
OUTPATIENT
Start: 2023-09-22

## 2023-09-22 RX ORDER — ACETAMINOPHEN 325 MG/1
650 TABLET ORAL EVERY 4 HOURS PRN
OUTPATIENT
Start: 2023-09-22

## 2023-09-22 RX ORDER — CARBOPROST TROMETHAMINE 250 UG/ML
250 INJECTION, SOLUTION INTRAMUSCULAR AS NEEDED
OUTPATIENT
Start: 2023-09-22

## 2023-09-22 RX ORDER — OXYTOCIN/0.9 % SODIUM CHLORIDE 30/500 ML
250 PLASTIC BAG, INJECTION (ML) INTRAVENOUS CONTINUOUS
OUTPATIENT
Start: 2023-09-22 | End: 2023-09-22

## 2023-09-22 RX ORDER — OXYCODONE AND ACETAMINOPHEN 7.5; 325 MG/1; MG/1
2 TABLET ORAL EVERY 4 HOURS PRN
OUTPATIENT
Start: 2023-09-22 | End: 2023-10-02

## 2023-09-22 RX ORDER — PENICILLIN G 3000000 [IU]/50ML
3 INJECTION, SOLUTION INTRAVENOUS EVERY 4 HOURS
OUTPATIENT
Start: 2023-09-22 | End: 2023-09-24

## 2023-09-22 RX ORDER — LIDOCAINE HYDROCHLORIDE 10 MG/ML
0.5 INJECTION, SOLUTION EPIDURAL; INFILTRATION; INTRACAUDAL; PERINEURAL ONCE AS NEEDED
OUTPATIENT
Start: 2023-09-22

## 2023-09-22 RX ORDER — SODIUM CHLORIDE, SODIUM LACTATE, POTASSIUM CHLORIDE, CALCIUM CHLORIDE 600; 310; 30; 20 MG/100ML; MG/100ML; MG/100ML; MG/100ML
125 INJECTION, SOLUTION INTRAVENOUS CONTINUOUS
OUTPATIENT
Start: 2023-09-22

## 2023-09-22 RX ORDER — OXYTOCIN/0.9 % SODIUM CHLORIDE 30/500 ML
2-30 PLASTIC BAG, INJECTION (ML) INTRAVENOUS
OUTPATIENT
Start: 2023-09-22

## 2023-09-22 NOTE — PROGRESS NOTES
"    OB FOLLOW UP  CC- Here for care of pregnancy        Marie Overton is a 28 y.o.  38w3d patient being seen today for her obstetrical follow up visit. Patient reports occasional BH contractions.  She denies headaches but has 3-4 episodes weekly of visual changes, \"seeing stars\".     Her prenatal care is complicated by (and status) :   Patient Active Problem List   Diagnosis    History of substance abuse    Pregnancy    Genital herpes affecting pregnancy, antepartum    Tobacco use during pregnancy, antepartum       GBS Status:   Group B Strep Culture   Date Value Ref Range Status   2023 Streptococcus agalactiae (Group B) (A)  Final     Comment:       This organism is considered to be universally susceptible to penicillin.  No further antibiotic testing will be performed. If Clindamycin or Erythromycin is the drug of choice, notify the laboratory within 7 days to request susceptibility testing.         No Known Allergies       Her Delivery Plan is: Desires IOL at 39wks. Scheduled    US today: no  Non Stress Test: Yes minutes 20  Reactive  indication: Hypertension    ROS -   Patient Denies: Loss of Fluid, Vaginal Spotting, Headaches, Nausea , Vomiting , and Epigastric pain  Fetal Movement : normal  All other systems reviewed and are negative.       The additional following portions of the patient's history were reviewed and updated as appropriate: allergies, current medications, past family history, past medical history, past social history, past surgical history, and problem list.    I have reviewed and agree with the HPI, ROS, and historical information as entered above. Tamiko Heath MD        EXAM:     Prenatal Vitals  BP: 128/80  Weight: 98.2 kg (216 lb 9.6 oz)   Fetal Heart Rate: NST              Urine Glucose Read-only: Negative  Urine Protein Read-only: Negative           Assessment and Plan    Problem List Items Addressed This Visit          Gynecologic and Obstetric Problems    Genital herpes " affecting pregnancy, antepartum    Overview     supp 36 wks         Relevant Medications    valACYclovir (Valtrex) 500 MG tablet       Other    History of substance abuse    Overview     On suboxone at nob. Has been sober x 6months. In counseling  [facility]         Pregnancy    Overview     EFW 31%ile 32 wks          Other Visit Diagnoses       Supervision of high-risk pregnancy with grand multiparity in third trimester    -  Primary    Relevant Orders    POC Urinalysis Dipstick (Completed)            Pregnancy at 38w3d  Fetal status reassuring.   Reviewed Pre-eclampsia signs/symptoms  Reviewed upcoming IOL with patient. Instructions given.  Delivery options reviewed with patient  Signs of labor reviewed  Kick counts reviewed  Activity and Exercise discussed.  No follow-ups on file.    Tamiko Heath MD  09/22/2023

## 2023-09-26 ENCOUNTER — HOSPITAL ENCOUNTER (OUTPATIENT)
Dept: LABOR AND DELIVERY | Facility: HOSPITAL | Age: 28
Discharge: HOME | End: 2023-09-26
Payer: MEDICAID

## 2023-09-26 ENCOUNTER — HOSPITAL ENCOUNTER (INPATIENT)
Facility: HOSPITAL | Age: 28
LOS: 3 days | Discharge: HOME | End: 2023-09-29
Attending: OBSTETRICS & GYNECOLOGY | Admitting: OBSTETRICS & GYNECOLOGY
Payer: MEDICAID

## 2023-09-26 ENCOUNTER — ANESTHESIA EVENT (OUTPATIENT)
Dept: LABOR AND DELIVERY | Facility: HOSPITAL | Age: 28
End: 2023-09-26
Payer: MEDICAID

## 2023-09-26 ENCOUNTER — ANESTHESIA (OUTPATIENT)
Dept: LABOR AND DELIVERY | Facility: HOSPITAL | Age: 28
End: 2023-09-26
Payer: MEDICAID

## 2023-09-26 DIAGNOSIS — Z3A.39 39 WEEKS GESTATION OF PREGNANCY: ICD-10-CM

## 2023-09-26 LAB
ABO GROUP BLD: NORMAL
ABO GROUP BLD: NORMAL
ALP SERPL-CCNC: 291 U/L (ref 39–117)
ALT SERPL W P-5'-P-CCNC: 13 U/L (ref 1–33)
AST SERPL-CCNC: 17 U/L (ref 1–32)
BILIRUB SERPL-MCNC: 0.2 MG/DL (ref 0–1.2)
BLD GP AB SCN SERPL QL: NEGATIVE
CREAT SERPL-MCNC: 0.64 MG/DL (ref 0.57–1)
DEPRECATED RDW RBC AUTO: 44 FL (ref 37–54)
ERYTHROCYTE [DISTWIDTH] IN BLOOD BY AUTOMATED COUNT: 13.4 % (ref 12.3–15.4)
HCT VFR BLD AUTO: 39.8 % (ref 34–46.6)
HGB BLD-MCNC: 13.4 G/DL (ref 12–15.9)
LDH SERPL-CCNC: 191 U/L (ref 135–214)
MCH RBC QN AUTO: 30.1 PG (ref 26.6–33)
MCHC RBC AUTO-ENTMCNC: 33.7 G/DL (ref 31.5–35.7)
MCV RBC AUTO: 89.4 FL (ref 79–97)
PLATELET # BLD AUTO: 183 10*3/MM3 (ref 140–450)
PMV BLD AUTO: 11.5 FL (ref 6–12)
RBC # BLD AUTO: 4.45 10*6/MM3 (ref 3.77–5.28)
RH BLD: POSITIVE
RH BLD: POSITIVE
T&S EXPIRATION DATE: NORMAL
URATE SERPL-MCNC: 4.6 MG/DL (ref 2.4–5.7)
WBC NRBC COR # BLD: 10.63 10*3/MM3 (ref 3.4–10.8)

## 2023-09-26 PROCEDURE — 82247 BILIRUBIN TOTAL: CPT | Performed by: OBSTETRICS & GYNECOLOGY

## 2023-09-26 PROCEDURE — 25010000002 PENICILLIN G POTASSIUM PER 600000 UNITS: Performed by: OBSTETRICS & GYNECOLOGY

## 2023-09-26 PROCEDURE — 85027 COMPLETE CBC AUTOMATED: CPT | Performed by: OBSTETRICS & GYNECOLOGY

## 2023-09-26 PROCEDURE — 86901 BLOOD TYPING SEROLOGIC RH(D): CPT | Performed by: OBSTETRICS & GYNECOLOGY

## 2023-09-26 PROCEDURE — 86900 BLOOD TYPING SEROLOGIC ABO: CPT | Performed by: OBSTETRICS & GYNECOLOGY

## 2023-09-26 PROCEDURE — 25010000002 ROPIVACAINE PER 1 MG: Performed by: NURSE ANESTHETIST, CERTIFIED REGISTERED

## 2023-09-26 PROCEDURE — 25010000002 FENTANYL CITRATE (PF) 50 MCG/ML SOLUTION: Performed by: OBSTETRICS & GYNECOLOGY

## 2023-09-26 PROCEDURE — S0260 H&P FOR SURGERY: HCPCS | Performed by: OBSTETRICS & GYNECOLOGY

## 2023-09-26 PROCEDURE — 84550 ASSAY OF BLOOD/URIC ACID: CPT | Performed by: OBSTETRICS & GYNECOLOGY

## 2023-09-26 PROCEDURE — 59025 FETAL NON-STRESS TEST: CPT

## 2023-09-26 PROCEDURE — 86900 BLOOD TYPING SEROLOGIC ABO: CPT

## 2023-09-26 PROCEDURE — 36415 COLL VENOUS BLD VENIPUNCTURE: CPT | Performed by: OBSTETRICS & GYNECOLOGY

## 2023-09-26 PROCEDURE — C1755 CATHETER, INTRASPINAL: HCPCS | Performed by: ANESTHESIOLOGY

## 2023-09-26 PROCEDURE — 0HQ9XZZ REPAIR PERINEUM SKIN, EXTERNAL APPROACH: ICD-10-PCS | Performed by: OBSTETRICS & GYNECOLOGY

## 2023-09-26 PROCEDURE — 25010000002 FENTANYL CITRATE (PF) 50 MCG/ML SOLUTION: Performed by: NURSE ANESTHETIST, CERTIFIED REGISTERED

## 2023-09-26 PROCEDURE — 82565 ASSAY OF CREATININE: CPT | Performed by: OBSTETRICS & GYNECOLOGY

## 2023-09-26 PROCEDURE — 86901 BLOOD TYPING SEROLOGIC RH(D): CPT

## 2023-09-26 PROCEDURE — 84075 ASSAY ALKALINE PHOSPHATASE: CPT | Performed by: OBSTETRICS & GYNECOLOGY

## 2023-09-26 PROCEDURE — 84450 TRANSFERASE (AST) (SGOT): CPT | Performed by: OBSTETRICS & GYNECOLOGY

## 2023-09-26 PROCEDURE — 83615 LACTATE (LD) (LDH) ENZYME: CPT | Performed by: OBSTETRICS & GYNECOLOGY

## 2023-09-26 PROCEDURE — 86850 RBC ANTIBODY SCREEN: CPT | Performed by: OBSTETRICS & GYNECOLOGY

## 2023-09-26 PROCEDURE — 84460 ALANINE AMINO (ALT) (SGPT): CPT | Performed by: OBSTETRICS & GYNECOLOGY

## 2023-09-26 PROCEDURE — C1755 CATHETER, INTRASPINAL: HCPCS

## 2023-09-26 PROCEDURE — 25010000002 ONDANSETRON PER 1 MG: Performed by: NURSE ANESTHETIST, CERTIFIED REGISTERED

## 2023-09-26 PROCEDURE — 59409 OBSTETRICAL CARE: CPT | Performed by: OBSTETRICS & GYNECOLOGY

## 2023-09-26 RX ORDER — CARBOPROST TROMETHAMINE 250 UG/ML
250 INJECTION, SOLUTION INTRAMUSCULAR AS NEEDED
Status: DISCONTINUED | OUTPATIENT
Start: 2023-09-26 | End: 2023-09-29 | Stop reason: HOSPADM

## 2023-09-26 RX ORDER — MORPHINE SULFATE 2 MG/ML
2 INJECTION, SOLUTION INTRAMUSCULAR; INTRAVENOUS
Status: DISCONTINUED | OUTPATIENT
Start: 2023-09-26 | End: 2023-09-28 | Stop reason: SDUPTHER

## 2023-09-26 RX ORDER — DIPHENHYDRAMINE HYDROCHLORIDE 50 MG/ML
12.5 INJECTION INTRAMUSCULAR; INTRAVENOUS EVERY 8 HOURS PRN
Status: DISCONTINUED | OUTPATIENT
Start: 2023-09-26 | End: 2023-09-27

## 2023-09-26 RX ORDER — PROMETHAZINE HYDROCHLORIDE 12.5 MG/1
12.5 SUPPOSITORY RECTAL EVERY 6 HOURS PRN
Status: DISCONTINUED | OUTPATIENT
Start: 2023-09-26 | End: 2023-09-29 | Stop reason: HOSPADM

## 2023-09-26 RX ORDER — MAGNESIUM SULFATE HEPTAHYDRATE 40 MG/ML
2 INJECTION, SOLUTION INTRAVENOUS CONTINUOUS
Status: DISCONTINUED | OUTPATIENT
Start: 2023-09-26 | End: 2023-09-29 | Stop reason: HOSPADM

## 2023-09-26 RX ORDER — IBUPROFEN 600 MG/1
600 TABLET ORAL EVERY 6 HOURS PRN
Status: DISCONTINUED | OUTPATIENT
Start: 2023-09-26 | End: 2023-09-29 | Stop reason: HOSPADM

## 2023-09-26 RX ORDER — OXYTOCIN/0.9 % SODIUM CHLORIDE 30/500 ML
250 PLASTIC BAG, INJECTION (ML) INTRAVENOUS CONTINUOUS
Status: ACTIVE | OUTPATIENT
Start: 2023-09-26 | End: 2023-09-26

## 2023-09-26 RX ORDER — ACETAMINOPHEN 325 MG/1
650 TABLET ORAL EVERY 4 HOURS PRN
Status: DISCONTINUED | OUTPATIENT
Start: 2023-09-26 | End: 2023-09-27

## 2023-09-26 RX ORDER — MAGNESIUM CARB/ALUMINUM HYDROX 105-160MG
30 TABLET,CHEWABLE ORAL ONCE
Status: DISCONTINUED | OUTPATIENT
Start: 2023-09-26 | End: 2023-09-27

## 2023-09-26 RX ORDER — PENICILLIN G 3000000 [IU]/50ML
3 INJECTION, SOLUTION INTRAVENOUS EVERY 4 HOURS
Status: DISCONTINUED | OUTPATIENT
Start: 2023-09-26 | End: 2023-09-26

## 2023-09-26 RX ORDER — SODIUM CHLORIDE 9 MG/ML
40 INJECTION, SOLUTION INTRAVENOUS AS NEEDED
Status: DISCONTINUED | OUTPATIENT
Start: 2023-09-26 | End: 2023-09-27

## 2023-09-26 RX ORDER — LIDOCAINE HYDROCHLORIDE AND EPINEPHRINE 15; 5 MG/ML; UG/ML
INJECTION, SOLUTION EPIDURAL AS NEEDED
Status: DISCONTINUED | OUTPATIENT
Start: 2023-09-26 | End: 2023-09-26 | Stop reason: SURG

## 2023-09-26 RX ORDER — ONDANSETRON 2 MG/ML
4 INJECTION INTRAMUSCULAR; INTRAVENOUS ONCE AS NEEDED
Status: COMPLETED | OUTPATIENT
Start: 2023-09-26 | End: 2023-09-26

## 2023-09-26 RX ORDER — METOCLOPRAMIDE HYDROCHLORIDE 5 MG/ML
10 INJECTION INTRAMUSCULAR; INTRAVENOUS ONCE AS NEEDED
Status: DISCONTINUED | OUTPATIENT
Start: 2023-09-26 | End: 2023-09-27

## 2023-09-26 RX ORDER — OXYCODONE AND ACETAMINOPHEN 7.5; 325 MG/1; MG/1
2 TABLET ORAL EVERY 4 HOURS PRN
Status: DISCONTINUED | OUTPATIENT
Start: 2023-09-26 | End: 2023-09-28 | Stop reason: SDUPTHER

## 2023-09-26 RX ORDER — LIDOCAINE HYDROCHLORIDE 10 MG/ML
0.5 INJECTION, SOLUTION EPIDURAL; INFILTRATION; INTRACAUDAL; PERINEURAL ONCE AS NEEDED
Status: DISCONTINUED | OUTPATIENT
Start: 2023-09-26 | End: 2023-09-27

## 2023-09-26 RX ORDER — SODIUM CHLORIDE 0.9 % (FLUSH) 0.9 %
10 SYRINGE (ML) INJECTION EVERY 12 HOURS SCHEDULED
Status: DISCONTINUED | OUTPATIENT
Start: 2023-09-26 | End: 2023-09-27

## 2023-09-26 RX ORDER — EPHEDRINE SULFATE 5 MG/ML
10 INJECTION INTRAVENOUS
Status: DISCONTINUED | OUTPATIENT
Start: 2023-09-26 | End: 2023-09-27

## 2023-09-26 RX ORDER — OXYTOCIN/0.9 % SODIUM CHLORIDE 30/500 ML
999 PLASTIC BAG, INJECTION (ML) INTRAVENOUS ONCE
Status: DISCONTINUED | OUTPATIENT
Start: 2023-09-26 | End: 2023-09-29 | Stop reason: HOSPADM

## 2023-09-26 RX ORDER — LIDOCAINE HCL/EPINEPHRINE/PF 2%-1:200K
VIAL (ML) INJECTION AS NEEDED
Status: DISCONTINUED | OUTPATIENT
Start: 2023-09-26 | End: 2023-09-26 | Stop reason: SURG

## 2023-09-26 RX ORDER — SODIUM CHLORIDE, SODIUM LACTATE, POTASSIUM CHLORIDE, CALCIUM CHLORIDE 600; 310; 30; 20 MG/100ML; MG/100ML; MG/100ML; MG/100ML
125 INJECTION, SOLUTION INTRAVENOUS CONTINUOUS
Status: DISCONTINUED | OUTPATIENT
Start: 2023-09-26 | End: 2023-09-27

## 2023-09-26 RX ORDER — METHYLERGONOVINE MALEATE 0.2 MG/ML
200 INJECTION INTRAVENOUS ONCE AS NEEDED
Status: DISCONTINUED | OUTPATIENT
Start: 2023-09-26 | End: 2023-09-29 | Stop reason: HOSPADM

## 2023-09-26 RX ORDER — PROMETHAZINE HYDROCHLORIDE 12.5 MG/1
12.5 TABLET ORAL EVERY 6 HOURS PRN
Status: DISCONTINUED | OUTPATIENT
Start: 2023-09-26 | End: 2023-09-27 | Stop reason: SDUPTHER

## 2023-09-26 RX ORDER — OXYTOCIN/0.9 % SODIUM CHLORIDE 30/500 ML
2-30 PLASTIC BAG, INJECTION (ML) INTRAVENOUS
Status: DISCONTINUED | OUTPATIENT
Start: 2023-09-26 | End: 2023-09-27

## 2023-09-26 RX ORDER — FENTANYL CITRATE 50 UG/ML
50 INJECTION, SOLUTION INTRAMUSCULAR; INTRAVENOUS
Status: DISCONTINUED | OUTPATIENT
Start: 2023-09-26 | End: 2023-09-27

## 2023-09-26 RX ORDER — FAMOTIDINE 10 MG/ML
20 INJECTION, SOLUTION INTRAVENOUS ONCE AS NEEDED
Status: DISCONTINUED | OUTPATIENT
Start: 2023-09-26 | End: 2023-09-27

## 2023-09-26 RX ORDER — CITRIC ACID/SODIUM CITRATE 334-500MG
30 SOLUTION, ORAL ORAL ONCE
Status: DISCONTINUED | OUTPATIENT
Start: 2023-09-26 | End: 2023-09-27

## 2023-09-26 RX ORDER — ROPIVACAINE HYDROCHLORIDE 2 MG/ML
15 INJECTION, SOLUTION EPIDURAL; INFILTRATION; PERINEURAL CONTINUOUS
Status: DISCONTINUED | OUTPATIENT
Start: 2023-09-26 | End: 2023-09-27

## 2023-09-26 RX ORDER — DEXTROSE AND SODIUM CHLORIDE 5; .2 G/100ML; G/100ML
75 INJECTION, SOLUTION INTRAVENOUS CONTINUOUS
Status: DISCONTINUED | OUTPATIENT
Start: 2023-09-26 | End: 2023-09-29 | Stop reason: HOSPADM

## 2023-09-26 RX ORDER — MISOPROSTOL 200 UG/1
800 TABLET ORAL AS NEEDED
Status: DISCONTINUED | OUTPATIENT
Start: 2023-09-26 | End: 2023-09-29 | Stop reason: HOSPADM

## 2023-09-26 RX ORDER — SODIUM CHLORIDE 0.9 % (FLUSH) 0.9 %
10 SYRINGE (ML) INJECTION AS NEEDED
Status: DISCONTINUED | OUTPATIENT
Start: 2023-09-26 | End: 2023-09-27

## 2023-09-26 RX ORDER — FENTANYL CITRATE 50 UG/ML
INJECTION, SOLUTION INTRAMUSCULAR; INTRAVENOUS AS NEEDED
Status: DISCONTINUED | OUTPATIENT
Start: 2023-09-26 | End: 2023-09-26 | Stop reason: SURG

## 2023-09-26 RX ORDER — ACETAMINOPHEN 325 MG/1
650 TABLET ORAL EVERY 4 HOURS PRN
Status: DISCONTINUED | OUTPATIENT
Start: 2023-09-26 | End: 2023-09-27 | Stop reason: SDUPTHER

## 2023-09-26 RX ADMIN — PENICILLIN G 3 MILLION UNITS: 3000000 INJECTION, SOLUTION INTRAVENOUS at 15:00

## 2023-09-26 RX ADMIN — ROPIVACAINE HYDROCHLORIDE 10 ML: 5 INJECTION, SOLUTION EPIDURAL; INFILTRATION; PERINEURAL at 10:33

## 2023-09-26 RX ADMIN — FENTANYL CITRATE 50 MCG: 50 INJECTION, SOLUTION INTRAMUSCULAR; INTRAVENOUS at 04:40

## 2023-09-26 RX ADMIN — LIDOCAINE HYDROCHLORIDE AND EPINEPHRINE 5 ML: 20; 5 INJECTION, SOLUTION EPIDURAL; INFILTRATION; INTRACAUDAL; PERINEURAL at 18:16

## 2023-09-26 RX ADMIN — ACETAMINOPHEN 650 MG: 325 TABLET ORAL at 20:48

## 2023-09-26 RX ADMIN — ONDANSETRON 4 MG: 2 INJECTION INTRAMUSCULAR; INTRAVENOUS at 16:57

## 2023-09-26 RX ADMIN — DEXTROSE AND SODIUM CHLORIDE 75 ML/HR: 5; 200 INJECTION, SOLUTION INTRAVENOUS at 21:15

## 2023-09-26 RX ADMIN — SODIUM CHLORIDE, POTASSIUM CHLORIDE, SODIUM LACTATE AND CALCIUM CHLORIDE 125 ML/HR: 600; 310; 30; 20 INJECTION, SOLUTION INTRAVENOUS at 09:30

## 2023-09-26 RX ADMIN — PENICILLIN G 3 MILLION UNITS: 3000000 INJECTION, SOLUTION INTRAVENOUS at 11:17

## 2023-09-26 RX ADMIN — EPHEDRINE SULFATE 10 MG: 5 INJECTION INTRAVENOUS at 18:54

## 2023-09-26 RX ADMIN — ROPIVACAINE HYDROCHLORIDE 15 ML/HR: 2 INJECTION, SOLUTION EPIDURAL; INFILTRATION; PERINEURAL at 10:34

## 2023-09-26 RX ADMIN — LIDOCAINE HYDROCHLORIDE AND EPINEPHRINE 3 ML: 15; 5 INJECTION, SOLUTION EPIDURAL at 10:26

## 2023-09-26 RX ADMIN — SODIUM CHLORIDE 5 MILLION UNITS: 900 INJECTION INTRAVENOUS at 03:07

## 2023-09-26 RX ADMIN — SODIUM CHLORIDE, POTASSIUM CHLORIDE, SODIUM LACTATE AND CALCIUM CHLORIDE 125 ML/HR: 600; 310; 30; 20 INJECTION, SOLUTION INTRAVENOUS at 03:01

## 2023-09-26 RX ADMIN — IBUPROFEN 600 MG: 600 TABLET, FILM COATED ORAL at 20:02

## 2023-09-26 RX ADMIN — FENTANYL CITRATE 50 MCG: 50 INJECTION, SOLUTION INTRAMUSCULAR; INTRAVENOUS at 06:34

## 2023-09-26 RX ADMIN — Medication 2 MILLI-UNITS/MIN: at 03:12

## 2023-09-26 RX ADMIN — FENTANYL CITRATE 100 MCG: 50 INJECTION, SOLUTION INTRAMUSCULAR; INTRAVENOUS at 10:29

## 2023-09-26 RX ADMIN — PENICILLIN G 3 MILLION UNITS: 3000000 INJECTION, SOLUTION INTRAVENOUS at 07:22

## 2023-09-26 NOTE — L&D DELIVERY NOTE
2023    Patient:Marie Overton    MR#:[id number]    Vaginal Delivery Note  28 y.o. yo female  at 39w0d      Pregnancy       Delivery     Delivery: Vaginal, Spontaneous     YOB: 2021    Time of Birth: 7:24 PM      Anesthesia: Epidural     Delivering clinician: Tamiko Heath    Forceps?   No   Vacuum? No    Shoulder dystocia present: No      Pt progressed to complete, pushed for 20 min and delivered without difficulty, baby vigorous and crying.     Infant    Findings: male  infant     Infant observations: Weight: 3140 g (6 lb 14.8 oz)     Observations/Comments:        Apgars:   @ 1 minute /      @ 5 minutes         Placenta, Cord, and Fluid    Placenta delivered  Spontaneous  at  2023  7:28 PM     Cord: 3 vessels  present.   Nuchal Cord?  yes; Number of nuchal loops present:  2    Cord blood obtained: Yes    Cord gases obtained:  No    Cord gas results: Pending         Repair    Episiotomy: No   Lacerations: Yes  Laceration Information  Laceration Repaired?   Perineal:  1st  yes   Periurethral:       Labial:       Sulcus:       Vaginal:       Cervical:         Suture used for repair: 2-0 Vicryl     Estimated Blood Loss:   200mls.         Complications  none    Disposition  Mother to Mother Baby/Postpartum  in stable condition currently.  Baby to remains with mom  in stable condition currently.                Tamiko Heath MD  23  19:42 EDT

## 2023-09-26 NOTE — ANESTHESIA PREPROCEDURE EVALUATION
Anesthesia Evaluation     Patient summary reviewed and Nursing notes reviewed   NPO Solid Status: > 6 hours  NPO Liquid Status: < 2 hours           Airway   Mallampati: II  TM distance: >3 FB  Neck ROM: full  Dental      Pulmonary    (+) a smoker Current,  Cardiovascular - negative cardio ROS        Neuro/Psych  (+) psychiatric history Depression  GI/Hepatic/Renal/Endo - negative ROS     Musculoskeletal (-) negative ROS    Abdominal    Substance History - negative use     OB/GYN    (+) Pregnant        Other - negative ROS                     Anesthesia Plan    ASA 2     epidural       Anesthetic plan, risks, benefits, and alternatives have been provided, discussed and informed consent has been obtained with: patient.    Plan discussed with attending.    CODE STATUS:    Level Of Support Discussed With: Patient  Code Status (Patient has no pulse and is not breathing): CPR (Attempt to Resuscitate)  Medical Interventions (Patient has pulse or is breathing): Full Support

## 2023-09-26 NOTE — PROCEDURES
28 y.o.  OB History          1    Para   0    Term   0       0    AB   0    Living   0         SAB   0    IAB   0    Ectopic   0    Molar   0    Multiple   0    Live Births   0             Presents at 39 0/7 weeks as an induction of labour due to unfavourable cervix.  Her primary OB requests a Bautista Bulb placement to initiate the induction of labour.    Fetal Heart Rate Assessment   Method:     Beats/min:     Baseline:     Varibility:     Accels:     Decels:     Tracing Category:       TOCO:  Irregular   SVE:  /-2    A Bautista Bulb was placed without difficulties with 60 cc of sterile saline.  The patient tolerated the procedure well.

## 2023-09-26 NOTE — H&P
History and Physical:    Subjective     No chief complaint on file.      Marie Overton is a 28 y.o. year old  with an Estimated Date of Delivery: 10/3/23 currently at 39w0d presenting with no complaints. For her schediuled inducxtion.    Prenatal care has been with Tamiko Heath MD.  It has been significant for  history of opiate abuse, has been on MAT and stable in pregnancy. History of HSV, no active outbreak or prodromal symptoms.  .        Review of Systems  Pertinent items are noted in HPI.     Past Medical History:   Diagnosis Date    Depression     Herpes     History of substance abuse     sober for 6 months    HPV (human papilloma virus) infection      Past Surgical History:   Procedure Laterality Date    APPENDECTOMY       Family History   Problem Relation Age of Onset    Breast cancer Neg Hx     Ovarian cancer Neg Hx     Uterine cancer Neg Hx     Colon cancer Neg Hx      Social History     Tobacco Use    Smoking status: Former     Types: Electronic Cigarette     Quit date: 3/13/2023     Years since quittin.5    Smokeless tobacco: Never    Tobacco comments:     Vape   Vaping Use    Vaping Use: Every day    Substances: Nicotine, Flavoring    Devices: Disposable   Substance Use Topics    Alcohol use: Not Currently    Drug use: Yes     Frequency: 7.0 times per week     Types: Fentanyl     Comment: sober for 6 months, on suboxone     Medications Prior to Admission   Medication Sig Dispense Refill Last Dose    buprenorphine (SUBUTEX) 8 MG sublingual tablet SL tablet    2023    ferrous sulfate 325 (65 Fe) MG tablet Take  by mouth Daily.   2023    Prenatal Vit-Fe Fumarate-FA (prenatal vitamin 28-0.8) 28-0.8 MG tablet tablet Take 1 tablet by mouth Daily.   2023    valACYclovir (Valtrex) 500 MG tablet Take 1 tablet by mouth Daily for 30 days. 30 tablet 0 2023    ketoconazole (NIZORAL) 2 % shampoo MASSAGE INTO SCALP 2-3 TIMES A WEEK. LET SIT 5 MINS BEFORE RINSING. FOLLOW WITH REGULAR  "SHAMPOO        Allergies:  Patient has no known allergies.  OB History    Para Term  AB Living   1 0 0 0 0 0   SAB IAB Ectopic Molar Multiple Live Births   0 0 0 0 0 0      # Outcome Date GA Lbr Jose/2nd Weight Sex Delivery Anes PTL Lv   1 Current                  Objective     /57   Pulse 82   Temp 97.6 °F (36.4 °C) (Oral)   Resp 16   Ht 162.6 cm (64\")   LMP 2022   SpO2 98%   Breastfeeding Yes   BMI 37.18 kg/m²     Physical Exam    General:  No acute distress           Abdomen: Gravid, nontender       FHT's: reactive    Cervix:    [place]: Contraction are irreg     Lab Review   External Prenatal Results       Pregnancy Outside Results - Transcribed From Office Records - See Scanned Records For Details       Test Value Date Time    ABO  O  23 034    Rh  Positive  23 0347    Antibody Screen  Negative  23 0257       Negative  23 1059       Negative  23 1110    Varicella IgG       Rubella  5.52 index 23 1110    Hgb  13.4 g/dL 23 0257       12.9 g/dL 23        11.5 g/dL 23 1059       12.6 g/dL 23 1110    Hct  39.8 % 23 0257       39.2 % 23        34.2 % 23 1059       39.0 % 23 1110    Glucose Fasting GTT       Glucose Tolerance Test 1 hour       Glucose Tolerance Test 3 hour       Gonorrhea (discrete)  Negative  23 1110    Chlamydia (discrete)  Negative  23 1110    RPR  Non Reactive  23 1110    VDRL       Syphilis Antibody       HBsAg  Negative  23 1110    Herpes Simplex Virus PCR       Herpes Simplex VIrus Culture       HIV  Non Reactive  23 1110    Hep C RNA Quant PCR       Hep C Antibody  Non Reactive  23 1110    AFP  37.5 ng/mL 23 1143    Group B Strep  Streptococcus agalactiae (Group B)  23 1845    GBS Susceptibility to Clindamycin       GBS Susceptibility to Erythromycin       Fetal Fibronectin       Genetic Testing, Maternal Blood                 " Drug Screening       Test Value Date Time    Urine Drug Screen       Amphetamine Screen  Negative ng/mL 02/23/23 1110    Barbiturate Screen  Negative ng/mL 02/23/23 1110    Benzodiazepine Screen  Negative ng/mL 02/23/23 1110    Methadone Screen  Negative ng/mL 02/23/23 1110    Phencyclidine Screen  Negative ng/mL 02/23/23 1110    Opiates Screen       THC Screen       Cocaine Screen       Propoxyphene Screen  Negative ng/mL 02/23/23 1110    Buprenorphine Screen       Methamphetamine Screen       Oxycodone Screen       Tricyclic Antidepressants Screen                 Legend    ^: Historical                                Assessment & Plan     ASSESSMENT  IUP at 39w0d  induction of labor   3. GBBSpositive  PLAN  Admit to labor and delivery   2.  pitocin, toro bulb, and antibiotics for GBBS prophylaxis         Tamiko Heath MD  9/26/2023@

## 2023-09-26 NOTE — ANESTHESIA PROCEDURE NOTES
Labor Epidural      Patient reassessed immediately prior to procedure    Patient location during procedure: floor  Performed By  CRNA/ROXANN: Lian Calvert CRNA  Preanesthetic Checklist  Completed: patient identified, IV checked, site marked, risks and benefits discussed, surgical consent, monitors and equipment checked, pre-op evaluation and timeout performed  Prep:  Pt Position:sitting  Sterile Tech:cap, gloves, mask and sterile barrier  Prep:DuraPrep  Monitoring:blood pressure monitoring  Epidural Block Procedure:  Approach:midline  Guidance:landmark technique and palpation technique  Location:L3-L4  Needle Type:Tuohy  Needle Gauge:17 G  Loss of Resistance Medium: air  Loss of Resistance: 6cm  Cath Depth at skin:12 cm  Paresthesia: none  Aspiration:negative  Test Dose:negative  Number of Attempts: 1  Post Assessment:  Dressing:occlusive dressing applied and secured with tape  Pt Tolerance:patient tolerated the procedure well with no apparent complications  Complications:no

## 2023-09-27 PROCEDURE — 0 MAGNESIUM SULFATE 20 GM/500ML SOLUTION: Performed by: OBSTETRICS & GYNECOLOGY

## 2023-09-27 RX ORDER — HYDROCODONE BITARTRATE AND ACETAMINOPHEN 10; 325 MG/1; MG/1
1 TABLET ORAL EVERY 4 HOURS PRN
Status: DISCONTINUED | OUTPATIENT
Start: 2023-09-27 | End: 2023-09-29 | Stop reason: HOSPADM

## 2023-09-27 RX ORDER — SODIUM CHLORIDE 0.9 % (FLUSH) 0.9 %
1-10 SYRINGE (ML) INJECTION AS NEEDED
Status: DISCONTINUED | OUTPATIENT
Start: 2023-09-27 | End: 2023-09-29 | Stop reason: HOSPADM

## 2023-09-27 RX ORDER — DOCUSATE SODIUM 100 MG/1
100 CAPSULE, LIQUID FILLED ORAL 2 TIMES DAILY
Status: DISCONTINUED | OUTPATIENT
Start: 2023-09-27 | End: 2023-09-29 | Stop reason: HOSPADM

## 2023-09-27 RX ORDER — PROMETHAZINE HYDROCHLORIDE 12.5 MG/1
12.5 TABLET ORAL EVERY 4 HOURS PRN
Status: DISCONTINUED | OUTPATIENT
Start: 2023-09-27 | End: 2023-09-29 | Stop reason: HOSPADM

## 2023-09-27 RX ORDER — OXYTOCIN/0.9 % SODIUM CHLORIDE 30/500 ML
125 PLASTIC BAG, INJECTION (ML) INTRAVENOUS CONTINUOUS PRN
Status: DISCONTINUED | OUTPATIENT
Start: 2023-09-27 | End: 2023-09-29 | Stop reason: HOSPADM

## 2023-09-27 RX ORDER — HYDROCODONE BITARTRATE AND ACETAMINOPHEN 5; 325 MG/1; MG/1
1 TABLET ORAL EVERY 4 HOURS PRN
Status: DISCONTINUED | OUTPATIENT
Start: 2023-09-27 | End: 2023-09-29 | Stop reason: HOSPADM

## 2023-09-27 RX ORDER — ONDANSETRON 4 MG/1
4 TABLET, FILM COATED ORAL EVERY 6 HOURS PRN
Status: DISCONTINUED | OUTPATIENT
Start: 2023-09-27 | End: 2023-09-29 | Stop reason: HOSPADM

## 2023-09-27 RX ORDER — ACETAMINOPHEN 325 MG/1
650 TABLET ORAL EVERY 6 HOURS PRN
Status: DISCONTINUED | OUTPATIENT
Start: 2023-09-27 | End: 2023-09-29 | Stop reason: HOSPADM

## 2023-09-27 RX ORDER — HYDROCORTISONE 25 MG/G
1 CREAM TOPICAL AS NEEDED
Status: DISCONTINUED | OUTPATIENT
Start: 2023-09-27 | End: 2023-09-29 | Stop reason: HOSPADM

## 2023-09-27 RX ORDER — MISOPROSTOL 200 UG/1
800 TABLET ORAL AS NEEDED
Status: DISCONTINUED | OUTPATIENT
Start: 2023-09-27 | End: 2023-09-29 | Stop reason: HOSPADM

## 2023-09-27 RX ORDER — BISACODYL 10 MG
10 SUPPOSITORY, RECTAL RECTAL DAILY PRN
Status: DISCONTINUED | OUTPATIENT
Start: 2023-09-27 | End: 2023-09-29 | Stop reason: HOSPADM

## 2023-09-27 RX ORDER — ONDANSETRON 2 MG/ML
4 INJECTION INTRAMUSCULAR; INTRAVENOUS EVERY 6 HOURS PRN
Status: DISCONTINUED | OUTPATIENT
Start: 2023-09-27 | End: 2023-09-29 | Stop reason: HOSPADM

## 2023-09-27 RX ADMIN — MAGNESIUM SULFATE IN WATER 2 G/HR: 20 INJECTION, SOLUTION INTRAVENOUS at 04:35

## 2023-09-27 RX ADMIN — DOCUSATE SODIUM 100 MG: 100 CAPSULE, LIQUID FILLED ORAL at 20:54

## 2023-09-27 RX ADMIN — DEXTROSE AND SODIUM CHLORIDE 75 ML/HR: 5; 200 INJECTION, SOLUTION INTRAVENOUS at 13:03

## 2023-09-27 RX ADMIN — MAGNESIUM SULFATE IN WATER 2 G/HR: 20 INJECTION, SOLUTION INTRAVENOUS at 13:03

## 2023-09-27 NOTE — PLAN OF CARE
Goal Outcome Evaluation:                   Problem: Breastfeeding  Goal: Effective Breastfeeding  Outcome: Ongoing, Progressing  Intervention: Promote Breast Care and Comfort  Flowsheets (Taken 9/27/2023 1518)  Breast Pumping: (has ordered electric pump through insurance and is being shipped)   double electric breast pump utilized   manual breast pump utilized  Intervention: Support Exclusive Breastfeeding Success  Flowsheets (Taken 9/27/2023 1518)  Supportive Measures:   active listening utilized   counseling provided   positive reinforcement provided  Breastfeeding Support:   lactation counseling provided   encouragement provided  Intervention: Promote Effective Breastfeeding  Flowsheets (Taken 9/27/2023 1518)  Breastfeeding Assistance: (has small nipple shield)   feeding cue recognition promoted   feeding on demand promoted   support offered   nipple shield utilized  Parent/Child Attachment Promotion:   skin-to-skin contact encouraged   positive reinforcement provided   strengths emphasized   caring behavior modeled   cue recognition promoted

## 2023-09-27 NOTE — PROGRESS NOTES
9/27/2023  PPD #1    Subjective   Marie feels well.  Patient describes her lochia less than menses.  Pain is well controlled    She was started on magnesium last night after delivery for severe range BPs.        Objective   Temp: Temp:  [97.6 °F (36.4 °C)-101.2 °F (38.4 °C)] 99.3 °F (37.4 °C) Temp src: Oral   BP: BP: (101-168)/() 116/57        Pulse: Heart Rate:  [] 86  RR: Resp:  [16-20] 20    General:  No acute distress   Abdomen: Fundus firm and beneath umbilicus   Pelvis: deferred     Lab Results   Component Value Date    WBC 10.63 09/26/2023    HGB 13.4 09/26/2023    HCT 39.8 09/26/2023    MCV 89.4 09/26/2023     09/26/2023    HEPBSAG Negative 02/23/2023       Assessment  PPD# 1 after vaginal delivery, PP preeclampsia on magnesium for 24 hours. BPs normal today. Labs normal.     Plan  Routine postpartum care. Will DC magnesium this evening. Can start antihypertensives prn.   Desires circ      This note has been electronically signed.    Tamiko Heath MD  September 27, 2023

## 2023-09-27 NOTE — LACTATION NOTE
23 1518   Maternal Information   Date of Referral 23   Person Making Referral lactation consultant   Maternal Reason for Referral no prior breastfeeding experience   Infant Reason for Referral  infant   Maternal Assessment   Breast Size Issue none   Breast Shape Bilateral:;round   Breast Density Bilateral:;soft   Nipples Bilateral:;graspable;short   Left Nipple Symptoms intact;nontender   Right Nipple Symptoms intact;nontender   Maternal Infant Feeding   Maternal Emotional State independent;relaxed;receptive   Pain with Feeding no   Latch Assistance minimal assistance;verbal guidance offered  (reporting latching is well utilizing small nipple shield)   Support Person Involvement actively supporting mother   Milk Expression/Equipment   Breast Pump Type double electric, hospital grade;double electric, personal;manual pump   Breast Pump Flange Type hard   Breast Pump Flange Size 24 mm   Equipment for Home Use breast pump provided;breast pump ordered through insurance  (utilizing hospital pump; has manual pump at bedside and pump from insurance company being shipped)   Breast Pumping   Breast Pumping Interventions early pumping promoted;post-feed pumping encouraged  (for short/missed feedings; while supplementing and to encourage milk production; pumped 1ml of EBM and showed mother how to syringe feed infant)   Breast Pumping double electric breast pump utilized;manual breast pump utilized  (has ordered electric pump through insurance and is being shipped)     Courtesy visit; mother stated latching is going well with infant using small nipple shield; at time of visit mother was syringe feeding infant; mother had pumped 1ml of colostrum; showed mother how to syringe feed infant with pacifier; encouraged to pump after nursing; went over education; utilizing hospital pump and has manual pump at bedside; ordered electric pump through insurance and is being shipped; encouraged to call lactation PRN.

## 2023-09-27 NOTE — PAYOR COMM NOTE
"Marie Overton (28 y.o. Female) Initial notification - maternity admission with delivery  ID [id number]      Date of Birth   1995    Social Security Number       Address   [address] [place] [address]    Home Phone   [phone]    MRN   [id number]       Gnosticism   Unknown    Marital Status   Single                            Admission Date   9/26/23    Admission Type   Elective    Admitting Provider   Tamiko Heath MD    Attending Provider   Tamiko Heath MD    Department, Room/Bed   [facility] ANTEPARTUM, N333/1       Discharge Date       Discharge Disposition       Discharge Destination                                 Attending Provider: Tamiko Heath MD    Allergies: No Known Allergies    Isolation: None   Infection: None   Code Status: CPR    Ht: 162.6 cm (64\")   Wt: 98.2 kg (216 lb 9.6 oz)    Admission Cmt: None   Principal Problem: Pregnancy [Z34.90]                   Active Insurance as of 9/26/2023       Primary Coverage       Payor Plan Insurance Group Employer/Plan Group    ANTHEM MEDICAID ANTHEM MEDICAID [id number]       Payor Plan Address Payor Plan Phone Number Payor Plan Fax Number Effective Dates    [address] [address] [phone]  1/1/2023 - None Entered    [facility] [address]         Subscriber Name Subscriber Birth Date Member ID       MARIE OVERTON 1995 [id number]                     Emergency Contacts        (Rel.) Home Phone Work Phone Mobile Phone    Starla Overton (Mother) [phone] -- --    yazmin vieira (Significant Other) -- [phone] --              Insurance Information                  ANTHEM MEDICAID/ANTHEM MEDICAID Phone: [phone]    Subscriber: Marie Overton Subscriber#: [id number]    Group#: [id number] Precert#: --             History & Physical        Tamiko Heath MD at 09/26/23 1204          History and Physical:    Subjective    No chief complaint on file.      Marie Overton is a 28 y.o. year old "  with an Estimated Date of Delivery: 10/3/23 currently at 39w0d presenting with no complaints. For her schediuled inducxtion.    Prenatal care has been with Tamiko Heath MD.  It has been significant for  history of opiate abuse, has been on MAT and stable in pregnancy. History of HSV, no active outbreak or prodromal symptoms.  .        Review of Systems  Pertinent items are noted in HPI.     Past Medical History:   Diagnosis Date    Depression     Herpes     History of substance abuse     sober for 6 months    HPV (human papilloma virus) infection      Past Surgical History:   Procedure Laterality Date    APPENDECTOMY       Family History   Problem Relation Age of Onset    Breast cancer Neg Hx     Ovarian cancer Neg Hx     Uterine cancer Neg Hx     Colon cancer Neg Hx      Social History     Tobacco Use    Smoking status: Former     Types: Electronic Cigarette     Quit date: 3/13/2023     Years since quittin.5    Smokeless tobacco: Never    Tobacco comments:     Vape   Vaping Use    Vaping Use: Every day    Substances: Nicotine, Flavoring    Devices: Disposable   Substance Use Topics    Alcohol use: Not Currently    Drug use: Yes     Frequency: 7.0 times per week     Types: Fentanyl     Comment: sober for 6 months, on suboxone     Medications Prior to Admission   Medication Sig Dispense Refill Last Dose    buprenorphine (SUBUTEX) 8 MG sublingual tablet SL tablet    2023    ferrous sulfate 325 (65 Fe) MG tablet Take  by mouth Daily.   2023    Prenatal Vit-Fe Fumarate-FA (prenatal vitamin 28-0.8) 28-0.8 MG tablet tablet Take 1 tablet by mouth Daily.   2023    valACYclovir (Valtrex) 500 MG tablet Take 1 tablet by mouth Daily for 30 days. 30 tablet 0 2023    ketoconazole (NIZORAL) 2 % shampoo MASSAGE INTO SCALP 2-3 TIMES A WEEK. LET SIT 5 MINS BEFORE RINSING. FOLLOW WITH REGULAR SHAMPOO        Allergies:  Patient has no known allergies.  OB History    Para Term  AB Living  "  1 0 0 0 0 0   SAB IAB Ectopic Molar Multiple Live Births   0 0 0 0 0 0      # Outcome Date GA Lbr Jose/2nd Weight Sex Delivery Anes PTL Lv   1 Current                  Objective    /57   Pulse 82   Temp 97.6 °F (36.4 °C) (Oral)   Resp 16   Ht 162.6 cm (64\")   LMP 12/27/2022   SpO2 98%   Breastfeeding Yes   BMI 37.18 kg/m²     Physical Exam    General:  No acute distress           Abdomen: Gravid, nontender       FHT's: reactive    Cervix: 1/50   Chical: Contraction are irreg     Lab Review   External Prenatal Results       Pregnancy Outside Results - Transcribed From Office Records - See Scanned Records For Details       Test Value Date Time    ABO  O  09/26/23 0347    Rh  Positive  09/26/23 0347    Antibody Screen  Negative  09/26/23 0257       Negative  07/11/23 1059       Negative  02/23/23 1110    Varicella IgG       Rubella  5.52 index 02/23/23 1110    Hgb  13.4 g/dL 09/26/23 0257       12.9 g/dL 09/07/23        11.5 g/dL 07/11/23 1059       12.6 g/dL 02/23/23 1110    Hct  39.8 % 09/26/23 0257       39.2 % 09/07/23        34.2 % 07/11/23 1059       39.0 % 02/23/23 1110    Glucose Fasting GTT       Glucose Tolerance Test 1 hour       Glucose Tolerance Test 3 hour       Gonorrhea (discrete)  Negative  02/23/23 1110    Chlamydia (discrete)  Negative  02/23/23 1110    RPR  Non Reactive  02/23/23 1110    VDRL       Syphilis Antibody       HBsAg  Negative  02/23/23 1110    Herpes Simplex Virus PCR       Herpes Simplex VIrus Culture       HIV  Non Reactive  02/23/23 1110    Hep C RNA Quant PCR       Hep C Antibody  Non Reactive  02/23/23 1110    AFP  37.5 ng/mL 04/21/23 1143    Group B Strep  Streptococcus agalactiae (Group B)  09/07/23 1845    GBS Susceptibility to Clindamycin       GBS Susceptibility to Erythromycin       Fetal Fibronectin       Genetic Testing, Maternal Blood                 Drug Screening       Test Value Date Time    Urine Drug Screen       Amphetamine Screen  Negative ng/mL 02/23/23 " 1110    Barbiturate Screen  Negative ng/mL 23 1110    Benzodiazepine Screen  Negative ng/mL 23 1110    Methadone Screen  Negative ng/mL 23 1110    Phencyclidine Screen  Negative ng/mL 23 1110    Opiates Screen       THC Screen       Cocaine Screen       Propoxyphene Screen  Negative ng/mL 23 1110    Buprenorphine Screen       Methamphetamine Screen       Oxycodone Screen       Tricyclic Antidepressants Screen                 Legend    ^: Historical                                Assessment & Plan    ASSESSMENT  IUP at 39w0d  induction of labor   3. GBBSpositive  PLAN  Admit to labor and delivery   2.  pitocin, toro bulb, and antibiotics for GBBS prophylaxis         Tamiko Heath MD  2023@    Electronically signed by Tamiko Heath MD at 23 1205          Operative/Procedure Notes (last 48 hours)        Kenny Villarreal MD at 23 033          28 y.o.  OB History          1    Para   0    Term   0       0    AB   0    Living   0         SAB   0    IAB   0    Ectopic   0    Molar   0    Multiple   0    Live Births   0             Presents at 39 0/7 weeks as an induction of labour due to unfavourable cervix.  Her primary OB requests a Toro Bulb placement to initiate the induction of labour.    Fetal Heart Rate Assessment   Method:     Beats/min:     Baseline:     Varibility:     Accels:     Decels:     Tracing Category:       TOCO:  Irregular   SVE:  1/60/-2    A Toro Bulb was placed without difficulties with 60 cc of sterile saline.  The patient tolerated the procedure well.    Electronically signed by Kenny Villarreal MD at 23       Tamiko Heath MD at 23 [id number]    Patient:Marie Overton    MR#:[id number]    Vaginal Delivery Note  28 y.o. yo female  at 39w0d      Pregnancy       Delivery     Delivery: Vaginal, Spontaneous     YOB: 2021    Time of Birth: 7:24 PM      Anesthesia: Epidural      Delivering clinician: Tamiko Heath    Forceps?   No   Vacuum? No    Shoulder dystocia present: No      Pt progressed to complete, pushed for 20 min and delivered without difficulty, baby vigorous and crying.     Infant    Findings: male  infant     Infant observations: Weight: 3140 g (6 lb 14.8 oz)     Observations/Comments:        Apgars:   @ 1 minute /      @ 5 minutes         Placenta, Cord, and Fluid    Placenta delivered  Spontaneous  at  9/26/2023  7:28 PM     Cord: 3 vessels  present.   Nuchal Cord?  yes; Number of nuchal loops present:  2    Cord blood obtained: Yes    Cord gases obtained:  No    Cord gas results: Pending         Repair    Episiotomy: No   Lacerations: Yes  Laceration Information  Laceration Repaired?   Perineal:  1st  yes   Periurethral:       Labial:       Sulcus:       Vaginal:       Cervical:         Suture used for repair: 2-0 Vicryl     Estimated Blood Loss:   200mls.         Complications  none    Disposition  Mother to Mother Baby/Postpartum  in stable condition currently.  Baby to remains with mom  in stable condition currently.                Tamiko Heath MD  09/26/23  19:42 EDT            Electronically signed by Tamiko Heath MD at 09/26/23 1944          Physician Progress Notes (last 48 hours)        Tamiko Heath MD at 09/26/23 1903          Complete/+2. Will start pushing.     Electronically signed by Tamiko Heath MD at 09/26/23 1903       Christi German MD at 09/26/23 0845          Bautista removed  AROM  IUPC placed  3/80/-1  NST Cat 1    Electronically signed by Christi German MD at 09/26/23 0927

## 2023-09-27 NOTE — ANESTHESIA POSTPROCEDURE EVALUATION
Patient: Marie Overton    Procedure Summary       Date: 09/26/23 Room / Location:     Anesthesia Start: 1020 Anesthesia Stop: 1930    Procedure: LABOR ANALGESIA Diagnosis:     Scheduled Providers:  Provider: Leona Kimble DO    Anesthesia Type: epidural ASA Status: 2            Anesthesia Type: epidural    Vitals  Vitals Value Taken Time   /56 09/27/23 0919   Temp 99.3 °F (37.4 °C) 09/27/23 0330   Pulse 101 09/27/23 1026   Resp 20 09/27/23 0630   SpO2 95 % 09/27/23 1026   Vitals shown include unvalidated device data.        Post Anesthesia Care and Evaluation    Patient location during evaluation: bedside  Patient participation: complete - patient participated  Level of consciousness: awake and alert  Pain management: adequate    Airway patency: patent  Anesthetic complications: No anesthetic complications    Cardiovascular status: acceptable  Respiratory status: acceptable  Hydration status: acceptable  Post Neuraxial Block status: Motor and sensory function returned to baseline and No signs or symptoms of PDPH

## 2023-09-28 ENCOUNTER — PATIENT OUTREACH (OUTPATIENT)
Dept: LABOR AND DELIVERY | Facility: HOSPITAL | Age: 28
End: 2023-09-28
Payer: MEDICAID

## 2023-09-28 LAB
BASOPHILS # BLD AUTO: 0.07 10*3/MM3 (ref 0–0.2)
BASOPHILS NFR BLD AUTO: 0.5 % (ref 0–1.5)
DEPRECATED RDW RBC AUTO: 44.5 FL (ref 37–54)
EOSINOPHIL # BLD AUTO: 0.22 10*3/MM3 (ref 0–0.4)
EOSINOPHIL NFR BLD AUTO: 1.5 % (ref 0.3–6.2)
ERYTHROCYTE [DISTWIDTH] IN BLOOD BY AUTOMATED COUNT: 13.6 % (ref 12.3–15.4)
HCT VFR BLD AUTO: 32 % (ref 34–46.6)
HGB BLD-MCNC: 10.9 G/DL (ref 12–15.9)
IMM GRANULOCYTES # BLD AUTO: 0.05 10*3/MM3 (ref 0–0.05)
IMM GRANULOCYTES NFR BLD AUTO: 0.4 % (ref 0–0.5)
LYMPHOCYTES # BLD AUTO: 3.35 10*3/MM3 (ref 0.7–3.1)
LYMPHOCYTES NFR BLD AUTO: 23.6 % (ref 19.6–45.3)
MCH RBC QN AUTO: 30.3 PG (ref 26.6–33)
MCHC RBC AUTO-ENTMCNC: 34.1 G/DL (ref 31.5–35.7)
MCV RBC AUTO: 88.9 FL (ref 79–97)
MONOCYTES # BLD AUTO: 1.45 10*3/MM3 (ref 0.1–0.9)
MONOCYTES NFR BLD AUTO: 10.2 % (ref 5–12)
NEUTROPHILS NFR BLD AUTO: 63.8 % (ref 42.7–76)
NEUTROPHILS NFR BLD AUTO: 9.07 10*3/MM3 (ref 1.7–7)
NRBC BLD AUTO-RTO: 0 /100 WBC (ref 0–0.2)
PLATELET # BLD AUTO: 159 10*3/MM3 (ref 140–450)
PMV BLD AUTO: 11.5 FL (ref 6–12)
RBC # BLD AUTO: 3.6 10*6/MM3 (ref 3.77–5.28)
WBC NRBC COR # BLD: 14.21 10*3/MM3 (ref 3.4–10.8)

## 2023-09-28 PROCEDURE — 85025 COMPLETE CBC W/AUTO DIFF WBC: CPT | Performed by: OBSTETRICS & GYNECOLOGY

## 2023-09-28 RX ORDER — BUPRENORPHINE HYDROCHLORIDE 8 MG/1
8 TABLET SUBLINGUAL DAILY
Status: COMPLETED | OUTPATIENT
Start: 2023-09-28 | End: 2023-09-29

## 2023-09-28 RX ADMIN — Medication 1 APPLICATION: at 22:34

## 2023-09-28 RX ADMIN — DOCUSATE SODIUM 100 MG: 100 CAPSULE, LIQUID FILLED ORAL at 22:34

## 2023-09-28 RX ADMIN — BUPRENORPHINE 8 MG: 8 TABLET SUBLINGUAL at 08:52

## 2023-09-28 RX ADMIN — DOCUSATE SODIUM 100 MG: 100 CAPSULE, LIQUID FILLED ORAL at 08:51

## 2023-09-28 RX ADMIN — IBUPROFEN 600 MG: 600 TABLET, FILM COATED ORAL at 22:43

## 2023-09-28 NOTE — PROGRESS NOTES
Postpartum Progress Note    Patient name: Marie Overton  YOB: 1995   MRN: [id number]  Referring Provider: Tamiko Hetah MD  Admission Date: 2023  Date of Service: 2023    ID: 28 y.o.     Diagnosis:   S/p vaginal delivery     Pregnancy       Subjective:      No complaints.  Moderate lochia.  Ambulating, voiding, tolerating diet.  Pain well controlled.  The patient is currently breastfeeding.   This baby is a male, desires circ prior to discharge.    Objective:      Vital signs:  Vital Signs Range for the last 24 hours  Temperature: Temp:  [98.2 °F (36.8 °C)-99 °F (37.2 °C)] 98.5 °F (36.9 °C)   Temp Source: Temp src: Oral   BP: BP: (113-144)/(57-81) 115/57   Pulse: Heart Rate:  [83-99] 87   Respirations: Resp:  [16-20] 16   Weight:       General: Alert & oriented x4, in no apparent distress  Abdomen: soft, nontender  Uterus: firm, nontender  Extremities: nontender; no edema      Labs:  Lab Results   Component Value Date    WBC 14.21 (H) 2023    HGB 10.9 (L) 2023    HCT 32.0 (L) 2023    MCV 88.9 2023     2023     Results from last 7 days   Lab Units 23  0347   ABO TYPING  O   RH TYPING  Positive     External Prenatal Results       Pregnancy Outside Results - Transcribed From Office Records - See Scanned Records For Details       Test Value Date Time    ABO  O  23 0347    Rh  Positive  23 0347    Antibody Screen  Negative  23 0257       Negative  23 1059       Negative  23 1110    Varicella IgG       Rubella  5.52 index 23 1110    Hgb  10.9 g/dL 23 0654       13.4 g/dL 23 0257       12.9 g/dL 23        11.5 g/dL 23 1059       12.6 g/dL 23 1110    Hct  32.0 % 23 0654       39.8 % 23 0257       39.2 % 23        34.2 % 23 1059       39.0 % 23 1110    Glucose Fasting GTT       Glucose Tolerance Test 1 hour       Glucose Tolerance Test 3 hour        Gonorrhea (discrete)  Negative  02/23/23 1110    Chlamydia (discrete)  Negative  02/23/23 1110    RPR  Non Reactive  02/23/23 1110    VDRL       Syphilis Antibody       HBsAg  Negative  02/23/23 1110    Herpes Simplex Virus PCR       Herpes Simplex VIrus Culture       HIV  Non Reactive  02/23/23 1110    Hep C RNA Quant PCR       Hep C Antibody  Non Reactive  02/23/23 1110    AFP  37.5 ng/mL 04/21/23 1143    Group B Strep  Streptococcus agalactiae (Group B)  09/07/23 1845    GBS Susceptibility to Clindamycin       GBS Susceptibility to Erythromycin       Fetal Fibronectin       Genetic Testing, Maternal Blood                 Drug Screening       Test Value Date Time    Urine Drug Screen       Amphetamine Screen  Negative ng/mL 02/23/23 1110    Barbiturate Screen  Negative ng/mL 02/23/23 1110    Benzodiazepine Screen  Negative ng/mL 02/23/23 1110    Methadone Screen  Negative ng/mL 02/23/23 1110    Phencyclidine Screen  Negative ng/mL 02/23/23 1110    Opiates Screen       THC Screen       Cocaine Screen       Propoxyphene Screen  Negative ng/mL 02/23/23 1110    Buprenorphine Screen       Methamphetamine Screen       Oxycodone Screen       Tricyclic Antidepressants Screen                 Legend    ^: Historical                            Assessment/Plan:      PPD#2 s/p vaginal delivery.  1. S/p vaginal delivery: Doing well.Continue routine postpartum care.    2. Infant feeding: Supportive care.  The patient is currently breastfeeding.  3. Hypertension mildly elevated since off magnesium drip. Will need to continue to monitor. May need HTN oral med.   4. Infant admitted until Sunday to watch for withdrawal.  5. Plan for patient discharge tomorrow.

## 2023-09-28 NOTE — OUTREACH NOTE
[facility]  IP Postpartum    Questions/Answers      Flowsheet Row Responses   Best Method for Contacting Cell   Support Person Present Yes   Does the patient have a car seat at the hospital Yes   Delivery Note Reviewed Reviewed   Were birth expectations met? Yes   Is there a need for additional support/resources? No   Lactation Note Reviewed Reviewed   Is additional support needed? No  [Provided phone number for outpatient lactation clinic in case needed after discharge.]   Any questions or concerns? No   Is the patient going to use Meds to Beds? Yes   Any concerns related discharge meds/ability to  prescriptions? No   OB Discharge Navigator Reviewed  Not Reviewed   Comment Not completed yet   Confirm Postpartum OB appointment No   Confirm initial well-child Pediatrician appointment date/time: No  [In process of making calls]   Additional post-discharge F/U appointments No   Does patient have transportation to appointments? Yes   Any other assistance needed to ensure she is able to attend appointments? No   Does patient have supplies needed at home for  care? Breast Pump, Clothing, Crib, Diapers, Formula            Marie is feeling well after delivery. Provided with outpatient lactation clinic contact information for breastfeeding or pumping assistance after discharge. Provided with POST Birth warning signs flyer from [employer]. Discussed signs and symptoms of preeclampsia. Encouraged to call with questions, concerns, or for support. Contact information provided. Will f/u 10/6.    VIRI Fulton RN  Maternity Nurse Navigator    2023, 11:12 EDT

## 2023-09-28 NOTE — LACTATION NOTE
09/28/23 0935   Maternal Information   Date of Referral 09/28/23   Person Making Referral lactation consultant   Maternal Reason for Referral no prior breastfeeding experience   Maternal Assessment   Breast Shape Bilateral:;round   Breast Density Bilateral:;soft   Maternal Infant Feeding   Maternal Emotional State independent;receptive   Infant Positioning clutch/football  (right)   Pain with Feeding no   Comfort Measures Before/During Feeding other (see comments)  (small shield)   Latch Assistance none needed     Patient had called out asking if what she was seeing baby do (smacking lips) is a hunger cue. LC to room. MOB holding infant, wide awake, smacking lips. Reviewed hunger cues and offered to assist with feeding. MOB positioning adjusted, baby placed next to her in football on right, and baby immediately latched on. MOB denies pain or discomfort with latch. Encouraged to call as needs arise.

## 2023-09-29 ENCOUNTER — TELEPHONE (OUTPATIENT)
Dept: OBSTETRICS AND GYNECOLOGY | Facility: CLINIC | Age: 28
End: 2023-09-29

## 2023-09-29 VITALS
RESPIRATION RATE: 16 BRPM | BODY MASS INDEX: 37.18 KG/M2 | OXYGEN SATURATION: 99 % | SYSTOLIC BLOOD PRESSURE: 135 MMHG | TEMPERATURE: 98 F | HEIGHT: 64 IN | DIASTOLIC BLOOD PRESSURE: 82 MMHG | HEART RATE: 69 BPM

## 2023-09-29 RX ORDER — IBUPROFEN 600 MG/1
600 TABLET ORAL EVERY 6 HOURS PRN
Qty: 30 TABLET | Refills: 0 | Status: SHIPPED | OUTPATIENT
Start: 2023-09-29 | End: 2023-10-09

## 2023-09-29 RX ADMIN — DOCUSATE SODIUM 100 MG: 100 CAPSULE, LIQUID FILLED ORAL at 08:40

## 2023-09-29 RX ADMIN — BUPRENORPHINE 8 MG: 8 TABLET SUBLINGUAL at 08:40

## 2023-09-29 RX ADMIN — IBUPROFEN 600 MG: 600 TABLET, FILM COATED ORAL at 08:40

## 2023-09-29 NOTE — DISCHARGE SUMMARY
Discharge Summary    Date of Admission: 2023  Date of Discharge:  2023      Patient: Marie Overton      MR#:[id number]    Delivery Provider: Tamiko Heath       Presenting Problem/History of Present Illness  Pregnancy [Z34.90]       Discharge Diagnosis: Vaginal delivery at 39w0d    Procedures:  Vaginal, Spontaneous     2023    7:24 PM        Hospital Course  Patient is a 28 y.o. female  at 39w0d status post vaginal delivery without complication.  She received magnesium sulfate for postpartum preeclampsia.  Her BP is wnl on no meds now and she is asymptomatic.  Baby will stay 5 days due to Subutex.  She was ready for discharge on postpartum day 3.     Infant:   male  fetus 3140 g (6 lb 14.8 oz)  with Apgar scores of 8 , 9  at five minutes.    Condition on Discharge:  Stable    Vital Signs  Temp:  [97.9 °F (36.6 °C)-98.6 °F (37 °C)] 98 °F (36.7 °C)  Heart Rate:  [69-94] 69  Resp:  [16-20] 16  BP: (122-137)/(68-89) 135/82    Lab Results   Component Value Date    WBC 14.21 (H) 2023    HGB 10.9 (L) 2023    HCT 32.0 (L) 2023    MCV 88.9 2023     2023       Discharge Disposition  Home or Self Care    Discharge Medications     Discharge Medications        New Medications        Instructions Start Date   ibuprofen 600 MG tablet  Commonly known as: ADVIL,MOTRIN   600 mg, Oral, Every 6 Hours PRN             Continue These Medications        Instructions Start Date   buprenorphine 8 MG sublingual tablet SL tablet  Commonly known as: SUBUTEX   No dose, route, or frequency recorded.      ferrous sulfate 325 (65 Fe) MG tablet   Oral, Daily      prenatal vitamin 28-0.8 28-0.8 MG tablet tablet   1 tablet, Oral, Daily      valACYclovir 500 MG tablet  Commonly known as: Valtrex   500 mg, Oral, Daily             Stop These Medications      ketoconazole 2 % shampoo  Commonly known as: NIZORAL              Follow-up Appointments  Future Appointments   Date Time Provider Department  [place]   10/26/2023 11:20 AM Tamiko Heath MD [facility]   11/2/2023  1:00 PM Joseph Vargas PA-C [facility]     Additional Instructions for the Follow-ups that You Need to Schedule       Discharge Follow-up with Specified Provider: anusha/np; 1 Week   As directed      To: anusha/eliazar   Follow Up: 1 Week   Follow Up Details: bp check                Mary Anne Renee, ELOY  09/29/23  09:29 EDT  Csd

## 2023-09-29 NOTE — TELEPHONE ENCOUNTER
Provider: DR. MORALES    Caller: JOHANN SHAFER    Relationship to Patient: SELF    Pharmacy:     Phone Number: [phone]     Reason for Call: APPOINTMENT FOR POSTPARTUM AND BLOOD PRESSURE CHECK    When was the patient last seen: 09.22.23    PATIENT CALLING FROM [facility] STATING THAT ONE OF DR. MORALES NURSES CAME BY AND SAID THAT HER APPOINTMENT FOR 10.26.23 IS TO FAR OUT AND THAT PATIENT NEEDS TO BE SEEN THE WEEK OF 10.2.23 - 10.06.23 PER THE PATIENT. I DID CALL OVER TO . PATIENT WAS TOLD THAT SHE NEEDS TO BE SEEN ABOUT HER BLOOD PRESSURE TO KEEP AND CHECK FOR OTHER THINGS AS WELL.    PATIENT CAN BE REACHED [phone]

## 2023-09-29 NOTE — PLAN OF CARE
Goal Outcome Evaluation:            VSS. Dc home today, baby will stay until Sunday as ECB.

## 2023-10-02 RX ORDER — VALACYCLOVIR HYDROCHLORIDE 500 MG/1
TABLET, FILM COATED ORAL
Qty: 30 TABLET | Refills: 0 | OUTPATIENT
Start: 2023-10-02

## 2023-10-04 ENCOUNTER — POSTPARTUM VISIT (OUTPATIENT)
Dept: OBSTETRICS AND GYNECOLOGY | Facility: CLINIC | Age: 28
End: 2023-10-04
Payer: MEDICAID

## 2023-10-04 VITALS
SYSTOLIC BLOOD PRESSURE: 144 MMHG | BODY MASS INDEX: 35.51 KG/M2 | DIASTOLIC BLOOD PRESSURE: 98 MMHG | WEIGHT: 208 LBS | HEIGHT: 64 IN

## 2023-10-04 DIAGNOSIS — N89.8 VAGINAL DISCHARGE: ICD-10-CM

## 2023-10-04 DIAGNOSIS — N89.8 VAGINAL ITCHING: Primary | ICD-10-CM

## 2023-10-04 RX ORDER — NIFEDIPINE 30 MG/1
30 TABLET, EXTENDED RELEASE ORAL DAILY
Qty: 30 TABLET | Refills: 1 | Status: SHIPPED | OUTPATIENT
Start: 2023-10-04 | End: 2023-10-09

## 2023-10-04 RX ORDER — FLUCONAZOLE 150 MG/1
150 TABLET ORAL DAILY
Qty: 2 TABLET | Refills: 0 | Status: SHIPPED | OUTPATIENT
Start: 2023-10-04 | End: 2023-10-09

## 2023-10-04 RX ORDER — FUROSEMIDE 20 MG/1
20 TABLET ORAL DAILY
Qty: 5 TABLET | Refills: 0 | Status: SHIPPED | OUTPATIENT
Start: 2023-10-04 | End: 2023-10-09

## 2023-10-04 NOTE — PROGRESS NOTES
"      Chief Complaint   Patient presents with    Postpartum Care     1 week BP       Postpartum blood pressure check visit         Marie Overton is a 28 y.o.  who presents today for a blood pressure check.    Vaginal, Spontaneous    Information for the patient's :  Eliecer Overton [[id number]]   2023   male   Eliecer Overton   3140 g (6 lb 14.8 oz)   Gestational Age: 39w0d    Baby Discharged: Discharged with Mom  Delivering Physician: Tamiko Heath MD    The patient was diagnosed with Postpartum preeclampsia.  The patient did not go home on blood pressure medication. She has not been tracking her BP at home. The patient complains of swelling in her lower extremities and seeing \"stars\" once since delivery.  The patient denies Headache, Right upper quadrant/ epigastric pain.     Patient describes her vaginal bleeding as moderate.  Patient is breast and bottle feeding.  Desires contraceptive methods: Abstinence for contraception. She denies bowel or bladder issues.    Patient denies postpartum depression. Postpartum Depression Screening Questionnaire: 4, no treatment indicated.      The additional following portions of the patient's history were reviewed and updated as appropriate: allergies, current medications, past family history, past medical history, past social history, past surgical history, and problem list.      Review of Systems   Constitutional: Negative.    HENT: Negative.     Eyes: Negative.    Respiratory: Negative.     Cardiovascular: Negative.    Gastrointestinal: Negative.    Endocrine: Negative.    Genitourinary: Negative.    Musculoskeletal: Negative.    Skin: Negative.    Allergic/Immunologic: Negative.    Neurological: Negative.    Hematological: Negative.    Psychiatric/Behavioral: Negative.     All other systems reviewed and are negative.     I have reviewed and agree with the HPI, ROS, and historical information as entered above. Kenny Gasca MD     Objective   BP " "144/98 Comment: 144/92  Ht 162.6 cm (64\")   Wt 94.3 kg (208 lb)   LMP 12/27/2022   Breastfeeding Yes   BMI 35.70 kg/m²     Physical Exam  Constitutional:       Appearance: Normal appearance.   HENT:      Head: Normocephalic and atraumatic.   Cardiovascular:      Rate and Rhythm: Normal rate and regular rhythm.   Pulmonary:      Effort: Pulmonary effort is normal. No respiratory distress.      Breath sounds: No wheezing or rales.   Musculoskeletal:      Right lower leg: 3+ Pitting Edema present.      Left lower leg: 3+ Pitting Edema present.   Neurological:      General: No focal deficit present.      Mental Status: She is alert.      Deep Tendon Reflexes: Reflexes normal.      Reflex Scores:       Patellar reflexes are 1+ on the right side and 1+ on the left side.  Psychiatric:         Mood and Affect: Mood normal.            Assessment and Plan    Problem List Items Addressed This Visit    None  Visit Diagnoses       Preeclampsia in postpartum period    -  Primary            S/p Vaginal delivery   Continued pelvic rest.   Started on Procardia XL 30mg and 5 day course of Lasix  Return precautions provided  Labs today  No follow-ups on file.    Kenny Gasca MD  10/04/2023   "

## 2023-10-05 LAB
ALP SERPL-CCNC: 169 U/L (ref 39–117)
ALT SERPL-CCNC: 22 U/L (ref 1–33)
AST SERPL-CCNC: 19 U/L (ref 1–32)
BASOPHILS # BLD AUTO: 0.06 10*3/MM3 (ref 0–0.2)
BASOPHILS NFR BLD AUTO: 0.8 % (ref 0–1.5)
BILIRUB SERPL-MCNC: 0.2 MG/DL (ref 0–1.2)
CREAT SERPL-MCNC: 0.79 MG/DL (ref 0.57–1)
EGFRCR SERPLBLD CKD-EPI 2021: 106 ML/MIN/1.73
EOSINOPHIL # BLD AUTO: 0.18 10*3/MM3 (ref 0–0.4)
EOSINOPHIL NFR BLD AUTO: 2.3 % (ref 0.3–6.2)
ERYTHROCYTE [DISTWIDTH] IN BLOOD BY AUTOMATED COUNT: 13.2 % (ref 12.3–15.4)
HCT VFR BLD AUTO: 36.4 % (ref 34–46.6)
HGB BLD-MCNC: 12.1 G/DL (ref 12–15.9)
IMM GRANULOCYTES # BLD AUTO: 0.04 10*3/MM3 (ref 0–0.05)
IMM GRANULOCYTES NFR BLD AUTO: 0.5 % (ref 0–0.5)
LDH SERPL L TO P-CCNC: 241 U/L (ref 135–214)
LYMPHOCYTES # BLD AUTO: 2.56 10*3/MM3 (ref 0.7–3.1)
LYMPHOCYTES NFR BLD AUTO: 32.9 % (ref 19.6–45.3)
MCH RBC QN AUTO: 30 PG (ref 26.6–33)
MCHC RBC AUTO-ENTMCNC: 33.2 G/DL (ref 31.5–35.7)
MCV RBC AUTO: 90.3 FL (ref 79–97)
MONOCYTES # BLD AUTO: 0.7 10*3/MM3 (ref 0.1–0.9)
MONOCYTES NFR BLD AUTO: 9 % (ref 5–12)
NEUTROPHILS # BLD AUTO: 4.24 10*3/MM3 (ref 1.7–7)
NEUTROPHILS NFR BLD AUTO: 54.5 % (ref 42.7–76)
NRBC BLD AUTO-RTO: 0 /100 WBC (ref 0–0.2)
PLATELET # BLD AUTO: 281 10*3/MM3 (ref 140–450)
RBC # BLD AUTO: 4.03 10*6/MM3 (ref 3.77–5.28)
URATE SERPL-MCNC: 6.6 MG/DL (ref 2.4–5.7)
WBC # BLD AUTO: 7.78 10*3/MM3 (ref 3.4–10.8)

## 2023-10-06 ENCOUNTER — PATIENT OUTREACH (OUTPATIENT)
Dept: LABOR AND DELIVERY | Facility: HOSPITAL | Age: 28
End: 2023-10-06
Payer: MEDICAID

## 2023-10-06 NOTE — OUTREACH NOTE
Motherhood Connection  Postpartum Check-In    Questions/Answers      Flowsheet Row Responses   Visit Setting Telephone   Best Method for Contacting Cell   OB Discharge Note Reviewed  Reviewed   OB Discharge Navigator Reviewed  Reviewed   OB Discharge Medications Reviewed  Reviewed    discharged home with mother? Yes   Current Pain Levels 0-10 0   Verbalized Emotional State Acceptance   Family/Support Network Significant Other, Other   Family/Support Network Comment Sister-in-law   Level of Involvement in Care Supportive, Interactive   Do you feel comfortable in your relationship with your baby? Yes   Have members of your household adjusted to your baby? Other   Comment FOB is having difficulty due to sleep interuption   Is the baby's father supportive and/or involved with the baby? Other   Comment FOB is having difficulty due to sleep interuption   How does your partner feel about the baby? Angry, Involved, Happy  [FOB is having difficulty due to sleep interuption, they have had some slight diffriculties as a result]   Do you feel safe at home, school and work? Yes   Are you in a relationship with someone who threatens you or hurts you? No   Do you have the resources to keep yourself and your baby healthy and safe? Yes   Lochia (per patient report) Rubra   Amount Scant   Number of pads per day 5  [panty liners]   Lochia Odor None   Is patient breastfeeding? Yes, pumping   pumping - Left Breast Nontender   Pumping - Right Breast Nontender   Pumping - Left Nipple Intact   Pumping - Right Nipple Intact   Frequency occsionally   Pumping Quantity 60   Storage of Milk refrigerator   Postpartum Depression Screening Education Education Provided   Doctor Appointments: Education Provided   Breastfeeding Education Education Provided   Family Planning Education Education Provided   Postpartum Care Education Education Provided   S & S to report Education Provided   Followup Appointments Made Yes   Well Child Visit  Appointments Made Yes   Appointment Date 10/26/23   Provider/Agency Dr. Heath   Well Child Check Up Date: 09/29/23   Did you complete the visit? Yes   Were there any specific concerns? Yes   Concerns: weight loss   Has additional follow-up with pediatrician or specialist been recommended? Yes   Follow-Up Recommended: Seen on 10/2 for weight check. Improved. Infant has thrush.   Umbilical Cord No reported signs or symptoms   Was the baby circumcised? No  [is going to see peds Urology at  for circumcision]   Infant Feeding Method Breast, Expressed Breast Milk   Breastfeeding Right   Time breastfeeding left: 15   Time breastfeeding right: 15   Frequency of feedings Q3H   Expressed milk PO (mL) 60   Expressed milk- frequency of feedings as needed   Number of wet diapers x 24 hours 10   Last BM x 24 hours 6   Emesis (Unmeasured Occurence) yes, several times   What safe sleep surface is available? Bassinet   Are there stuffed animals, toys, pillows, quilts, blankets, wedges, positioners, bumpers or other loose bedding in the infant's sleeping environment? No   Where does the baby usually sleep? Bassinet   Does the baby ever share a sleep surface with a sibling, adult or pet? No   Does the baby ever share a sleep surface in a bed, couch, recliner or other? No   What position do you place your baby to sleep for naps? Back   What position do you place your baby to sleep at night Back   Are you and/or other caregivers smoking inside or outside the baby's home? No   Is the infant dressed appropiately for the temperature of the home? Yes            Review of Systems   Constitutional: Negative.    HENT: Negative.     Eyes: Negative.    Respiratory: Negative.     Cardiovascular:  Positive for leg swelling.   Gastrointestinal: Negative.    Endocrine: Negative.    Genitourinary:  Positive for vaginal bleeding.   Musculoskeletal: Negative.    Skin: Negative.    Allergic/Immunologic: Negative.    Neurological: Negative.     Hematological: Negative.    Psychiatric/Behavioral:  Positive for dysphoric mood, sleep disturbance and stress.      Most Recent [place]  Depression Scale Score (EPDS)    Performed by a clinician: 3 (10/6/2023  2:14 PM)    PN EPDS=6, IP EPDS=1.  did one in the office yesterday which was 4.    Marie is feeling well since going home. Minimal pain and lochia WNL. Some difficulties with breastfeeding. Thinks she has had some plugged ducts so is pumping more now. Still using the nipple shield but would like to stop using that if possible. Discussed calling outpatient lactation clinic for assistance with latch issues and plugged ducts. Provided phone number.     mood has been stable but she might just have a little of the Baby Blues. Reports she and her partner have had some tension between them and a verbal fight d/t baby crying at night and he has had to continue working as he runs his own business. Discussed how sleep deprivation can be a struggle for all family members. Discussed her healing body, hormone fluctuations, and mood changes with new motherhood. Emotional support provided. States she went to her MAT clinic yesterday but did not get to talk with her counselor as they are in a transition period to a new counselor so she should get to talk with them next time. Discussed if she would like additional mental health support, Dr. Heath can refer to to [facility] for virtual mental health care.     Baby doing well but there were initial concerns with weight loss at first appointment. Was seen again on 10/2 with no concerns at follow up appointment.     Updated resources provided via [employer] message. Encouraged to look at both the [employer] message and in the Visits tab for educational items pertaining to her recovery in the AVS. RN from call center to f/u next week. Contact information provided. Encouraged to call with questions, concerns, or for support before  then.    VIRI Fulton RN  Maternity Nurse Navigator    10/6/2023, 15:19 EDT

## 2023-10-08 ENCOUNTER — PATIENT OUTREACH (OUTPATIENT)
Dept: CALL CENTER | Facility: HOSPITAL | Age: 28
End: 2023-10-08
Payer: MEDICAID

## 2023-10-08 NOTE — OUTREACH NOTE
Motherhood Connection Survey      Flowsheet Row Responses   [facility] facility patient discharged from? [place]   Week 1 attempt successful? No   Unsuccessful attempts Attempt 1  [pt requested call back tomorrow, was not available to talk]   Reschedule Tomorrow              Jenny CHAVARRIA - Registered Nurse

## 2023-10-09 ENCOUNTER — PATIENT OUTREACH (OUTPATIENT)
Dept: CALL CENTER | Facility: HOSPITAL | Age: 28
End: 2023-10-09
Payer: MEDICAID

## 2023-10-09 ENCOUNTER — POSTPARTUM VISIT (OUTPATIENT)
Dept: OBSTETRICS AND GYNECOLOGY | Facility: CLINIC | Age: 28
End: 2023-10-09
Payer: MEDICAID

## 2023-10-09 VITALS
HEIGHT: 64 IN | DIASTOLIC BLOOD PRESSURE: 82 MMHG | WEIGHT: 195 LBS | SYSTOLIC BLOOD PRESSURE: 112 MMHG | BODY MASS INDEX: 33.29 KG/M2

## 2023-10-09 PROBLEM — A60.09 GENITAL HERPES AFFECTING PREGNANCY, ANTEPARTUM: Status: RESOLVED | Noted: 2023-02-23 | Resolved: 2023-10-09

## 2023-10-09 PROBLEM — Z34.90 PREGNANCY: Status: RESOLVED | Noted: 2023-02-23 | Resolved: 2023-10-09

## 2023-10-09 PROBLEM — O98.319 GENITAL HERPES AFFECTING PREGNANCY, ANTEPARTUM: Status: RESOLVED | Noted: 2023-02-23 | Resolved: 2023-10-09

## 2023-10-09 NOTE — OUTREACH NOTE
Motherhood Connection Survey      Flowsheet Row Responses   [facility] facility patient discharged from? [place]   Week 1 attempt successful? No   Unsuccessful attempts Attempt 2   Reschedule Today              Ramos GREENE - Registered Nurse

## 2023-10-09 NOTE — PROGRESS NOTES
Chief Complaint   Patient presents with    Postpartum Care       Postpartum blood pressure check visit         Marie Overton is a 28 y.o.  who presents today for a blood pressure check. She is two weeks postpartum.     Vaginal, Spontaneous    Information for the patient's :  George Mejía [[id number]]   2023   male   George Mejía   3140 g (6 lb 14.8 oz)   Gestational Age: 39w0d    Baby Discharged: from [facility] at 5 days for TANK   Delivering Physician: Tamiko Heath MD    The patient was diagnosed with Postpartum preeclampsia.  The patient did go home on blood pressure medication, nifedipine and lasix. She has not been taking her blood pressure at home since discharge from the hospital. The patient has no complaints.  The patient denies Headache, Right upper quadrant/ epigastric pain, Vision changes, and Swelling.     Patient describes her vaginal bleeding as light.  Patient is breast feeding.  Desires contraceptive methods: unsure  for contraception. She denies bowel or bladder issues.    Patient denies postpartum depression. Postpartum Depression Screening Questionnaire: 0,  no treatment indicated.      The additional following portions of the patient's history were reviewed and updated as appropriate: allergies, current medications, past family history, past medical history, past social history, past surgical history, and problem list.      Review of Systems   Constitutional: Negative.    HENT: Negative.     Eyes: Negative.    Respiratory: Negative.     Cardiovascular: Negative.    Gastrointestinal: Negative.    Endocrine: Negative.    Genitourinary: Negative.    Musculoskeletal: Negative.    Skin: Negative.    Allergic/Immunologic: Negative.    Neurological: Negative.    Hematological: Negative.    Psychiatric/Behavioral: Negative.       All other systems reviewed and are negative.     I have reviewed and agree with the HPI, ROS, and historical information as entered above. Siri  "ELOY Gale      Objective   /82 (BP Location: Right arm, Patient Position: Sitting, Cuff Size: Adult)   Ht 162.6 cm (64\")   Wt 88.5 kg (195 lb)   LMP 12/27/2022   Breastfeeding Yes   BMI 33.47 kg/mý     Physical Exam  Vitals and nursing note reviewed.   Constitutional:       Appearance: Normal appearance.   Pulmonary:      Effort: Pulmonary effort is normal.   Musculoskeletal:         General: Normal range of motion.      Right lower leg: No edema.      Left lower leg: No edema.   Skin:     General: Skin is warm and dry.   Neurological:      Mental Status: She is alert and oriented to person, place, and time.              Assessment and Plan    Problem List Items Addressed This Visit    None  Visit Diagnoses       Preeclampsia in postpartum period    -  Primary            S/p Vaginal delivery   Continued pelvic rest.   Will stop nifedipine, preeclampsia signs discussed again. Lasix course is finished.   Return in about 1 week (around 10/16/2023) for BP check.    ELOY Villarreal  10/09/2023    "

## 2023-10-10 ENCOUNTER — PATIENT OUTREACH (OUTPATIENT)
Dept: CALL CENTER | Facility: HOSPITAL | Age: 28
End: 2023-10-10
Payer: MEDICAID

## 2023-10-10 NOTE — OUTREACH NOTE
Motherhood Connection Survey      Flowsheet Row Responses   [facility] facility patient discharged from? [place]   Week 1 attempt successful? No   Unsuccessful attempts Attempt 3              Dominga S - Registered Nurse

## 2023-10-26 ENCOUNTER — POSTPARTUM VISIT (OUTPATIENT)
Dept: OBSTETRICS AND GYNECOLOGY | Facility: CLINIC | Age: 28
End: 2023-10-26
Payer: MEDICAID

## 2023-10-26 VITALS
SYSTOLIC BLOOD PRESSURE: 140 MMHG | HEIGHT: 64 IN | BODY MASS INDEX: 33.29 KG/M2 | DIASTOLIC BLOOD PRESSURE: 80 MMHG | WEIGHT: 195 LBS

## 2023-10-26 DIAGNOSIS — Z30.014 ENCOUNTER FOR INITIAL PRESCRIPTION OF INTRAUTERINE CONTRACEPTIVE DEVICE (IUD): ICD-10-CM

## 2023-10-26 RX ORDER — FERROUS SULFATE 325(65) MG
325 TABLET ORAL
COMMUNITY

## 2023-10-26 NOTE — PROGRESS NOTES
Chief Complaint   Patient presents with    Postpartum Care     4 wks BP check        Postpartum Visit         Marie Overton is a 28 y.o.  who presents today for a 4 week(s) postpartum check.       Vaginal, Spontaneous    Information for the patient's :  George Mejía [[id number]]   2023   male   George Mejía   3140 g (6 lb 14.8 oz)   Gestational Age: 39w0d        Baby Discharged: Discharged with Mom after being observed for several days after delivery.   Delivering Physician: Tamiko Heath MD    At the time of delivery were you diagnosed with any of the following: PP preeclampsia. The laceration was 1st degree and is healing well. Patient describes vaginal bleeding as light.  Patient is breast feeding.  She desires contraceptive methods: IUD  for contraception.  Patient denies bowel or bladder issues.    She was prescribed procardia and lasix by Dr. Gasca on 10/4/23.  She stopped procardia on 10/9/2023 as instructed by Siri Gale.  She has had one headache since then,  and reports blurred vision since delivery. She had her BP checked this morning at another MD appointment and systolic was 150 but she doesn't remember diastolic.       Patient denies postpartum depression. Postpartum Depression Screening Questionnaire: 0, No treatment indicated.      .   Last Completed Pap Smear            PAP SMEAR (Yearly) Next due on 2022  LIQUID-BASED PAP SMEAR, [facility] ([facility])    2020  Done - [facility]- ASCUS-HPV non 16/18 positive                      The additional following portions of the patient's history were reviewed and updated as appropriate: allergies, current medications, past family history, past medical history, past social history, past surgical history, and problem list.    Review of Systems  All other systems reviewed and are negative.     I have reviewed and agree with the HPI, ROS, and historical information as entered above. Tamiko  "MD Ivana      /80   Ht 162.6 cm (64\")   Wt 88.5 kg (195 lb)   LMP 12/27/2022   Breastfeeding Yes   BMI 33.47 kg/m²     Physical Exam  Vitals and nursing note reviewed.   Constitutional:       Appearance: Normal appearance.   HENT:      Head: Normocephalic and atraumatic.   Eyes:      General: No scleral icterus.     Pupils: Pupils are equal, round, and reactive to light.   Pulmonary:      Effort: Pulmonary effort is normal.      Breath sounds: Normal breath sounds.   Abdominal:      General: Bowel sounds are normal. There is no distension.      Palpations: Abdomen is soft.      Tenderness: There is no abdominal tenderness.   Musculoskeletal:         General: Normal range of motion.      Cervical back: Normal range of motion and neck supple.      Right lower leg: No edema.      Left lower leg: No edema.   Skin:     General: Skin is warm and dry.   Neurological:      General: No focal deficit present.      Mental Status: She is alert and oriented to person, place, and time.   Psychiatric:         Mood and Affect: Mood normal.         Behavior: Behavior normal. Behavior is cooperative.             Assessment and Plan    Problem List Items Addressed This Visit    None  Visit Diagnoses       Postpartum exam    -  Primary    Encounter for initial prescription of intrauterine contraceptive device (IUD)                S/p Vaginal delivery, 4 week(s) postpartum.  Doing well.  BP still borderline, no longer taking meds. Will monitor at her FU.   Return to normal physical activity.  No pelvic restrictions.   Baby doing well.  Breastfeeding going well.  No si/sx of postpartum depression  Contraception: contraceptive methods: IUD.  Insertion date: to be sched  Return in about 1 month (around 11/26/2023) for Postpartum, IUD Insertion- check mirena benefits.     Tamiko Heath MD  10/26/2023   "

## 2023-11-01 ENCOUNTER — LAB (OUTPATIENT)
Dept: INTERNAL MEDICINE | Facility: CLINIC | Age: 28
End: 2023-11-01
Payer: MEDICAID

## 2023-11-01 ENCOUNTER — OFFICE VISIT (OUTPATIENT)
Dept: INTERNAL MEDICINE | Facility: CLINIC | Age: 28
End: 2023-11-01
Payer: MEDICAID

## 2023-11-01 VITALS
HEIGHT: 64 IN | TEMPERATURE: 97.1 F | HEART RATE: 76 BPM | BODY MASS INDEX: 32.71 KG/M2 | WEIGHT: 191.6 LBS | OXYGEN SATURATION: 98 % | DIASTOLIC BLOOD PRESSURE: 68 MMHG | SYSTOLIC BLOOD PRESSURE: 118 MMHG

## 2023-11-01 DIAGNOSIS — R41.840 ATTENTION DEFICIT: ICD-10-CM

## 2023-11-01 DIAGNOSIS — E61.1 IRON DEFICIENCY: ICD-10-CM

## 2023-11-01 DIAGNOSIS — F19.11 HISTORY OF SUBSTANCE ABUSE: ICD-10-CM

## 2023-11-01 DIAGNOSIS — Z00.00 ANNUAL PHYSICAL EXAM: Primary | ICD-10-CM

## 2023-11-01 DIAGNOSIS — Z00.00 ANNUAL PHYSICAL EXAM: ICD-10-CM

## 2023-11-01 LAB
DEPRECATED RDW RBC AUTO: 41.7 FL (ref 37–54)
ERYTHROCYTE [DISTWIDTH] IN BLOOD BY AUTOMATED COUNT: 12.8 % (ref 12.3–15.4)
HBA1C MFR BLD: 5.4 % (ref 4.8–5.6)
HCT VFR BLD AUTO: 42.6 % (ref 34–46.6)
HGB BLD-MCNC: 14.2 G/DL (ref 12–15.9)
MCH RBC QN AUTO: 29.9 PG (ref 26.6–33)
MCHC RBC AUTO-ENTMCNC: 33.3 G/DL (ref 31.5–35.7)
MCV RBC AUTO: 89.7 FL (ref 79–97)
PLATELET # BLD AUTO: 214 10*3/MM3 (ref 140–450)
PMV BLD AUTO: 11.2 FL (ref 6–12)
RBC # BLD AUTO: 4.75 10*6/MM3 (ref 3.77–5.28)
WBC NRBC COR # BLD: 6.89 10*3/MM3 (ref 3.4–10.8)

## 2023-11-01 PROCEDURE — 83540 ASSAY OF IRON: CPT | Performed by: PHYSICIAN ASSISTANT

## 2023-11-01 PROCEDURE — 84466 ASSAY OF TRANSFERRIN: CPT | Performed by: PHYSICIAN ASSISTANT

## 2023-11-01 PROCEDURE — 36415 COLL VENOUS BLD VENIPUNCTURE: CPT | Performed by: PHYSICIAN ASSISTANT

## 2023-11-01 PROCEDURE — 80061 LIPID PANEL: CPT | Performed by: PHYSICIAN ASSISTANT

## 2023-11-01 PROCEDURE — 83036 HEMOGLOBIN GLYCOSYLATED A1C: CPT | Performed by: PHYSICIAN ASSISTANT

## 2023-11-01 PROCEDURE — 80050 GENERAL HEALTH PANEL: CPT | Performed by: PHYSICIAN ASSISTANT

## 2023-11-01 NOTE — PROGRESS NOTES
[facility]  [address]  [place] [address]  Dept: [phone]  Dept Fax: [phone]  Loc: [phone]  Loc Fax: [phone]    Marie Overton  1995    Follow Up Office Visit Note    History of Present Illness:  Patient is a 28-year-old female in today for annual physical and to establish care for attention deficit disorder.  Patient needing referral to psychiatry.  Was on Adderall.  Patient has history of substance abuse.  Patient celebrating a year and a half of sobriety.  On Subutex.  Currently nursing her .  Overall otherwise doing well and feeling well.        The following portions of the patient's history were reviewed and updated as appropriate: allergies, current medications, past family history, past medical history, past social history, past surgical history, and problem list.    Medications:    Current Outpatient Medications:     buprenorphine (SUBUTEX) 8 MG sublingual tablet SL tablet, , Disp: , Rfl:     ferrous sulfate 325 (65 FE) MG tablet, Take 1 tablet by mouth Daily With Breakfast., Disp: , Rfl:     Prenatal Vit-Fe Fumarate-FA (prenatal vitamin 28-0.8) 28-0.8 MG tablet tablet, Take 1 tablet by mouth Daily., Disp: , Rfl:     Subjective  No Known Allergies     Past Medical History:   Diagnosis Date    Depression     Herpes     History of substance abuse     sober for 6 months    HPV (human papilloma virus) infection     Preeclampsia in postpartum period        Past Surgical History:   Procedure Laterality Date    APPENDECTOMY         Family History   Problem Relation Age of Onset    Breast cancer Neg Hx     Ovarian cancer Neg Hx     Uterine cancer Neg Hx     Colon cancer Neg Hx         Social History     Socioeconomic History    Marital status: Single   Tobacco Use    Smoking status: Former     Types: Electronic Cigarette     Quit date: 3/13/2023     Years since quittin.6    Smokeless tobacco: Never    Tobacco  "comments:     Vape   Vaping Use    Vaping Use: Every day    Substances: Nicotine, Flavoring    Devices: Disposable   Substance and Sexual Activity    Alcohol use: Not Currently    Drug use: Yes     Frequency: 7.0 times per week     Types: Fentanyl     Comment: sober for 6 months, on suboxone    Sexual activity: Yes     Partners: Male     Birth control/protection: None       Review of Systems   Constitutional:  Negative for activity change, chills, fatigue, fever and unexpected weight change.   HENT:  Negative for congestion, ear pain, postnasal drip, sinus pressure and sore throat.    Eyes:  Negative for pain, discharge and redness.   Respiratory:  Negative for cough, shortness of breath and wheezing.    Cardiovascular:  Negative for chest pain, palpitations and leg swelling.   Gastrointestinal:  Negative for diarrhea, nausea and vomiting.   Endocrine: Negative for cold intolerance and heat intolerance.   Genitourinary:  Negative for decreased urine volume and dysuria.   Musculoskeletal:  Negative for arthralgias and myalgias.   Skin:  Negative for rash and wound.   Neurological:  Negative for dizziness, light-headedness and headaches.   Hematological:  Does not bruise/bleed easily.   Psychiatric/Behavioral:  Positive for decreased concentration. Negative for confusion, dysphoric mood and sleep disturbance. The patient is not nervous/anxious.          Objective  Vitals:    11/01/23 0934   BP: 118/68   BP Location: Left arm   Patient Position: Sitting   Cuff Size: Large Adult   Pulse: 76   Temp: 97.1 °F (36.2 °C)   TempSrc: Temporal   SpO2: 98%   Weight: 86.9 kg (191 lb 9.6 oz)   Height: 162.6 cm (64.02\")     Body mass index is 32.87 kg/m².     Physical Exam  Physical Exam  Vitals and nursing note reviewed.   Constitutional:       General: She is not in acute distress.     Appearance: She is not ill-appearing.   HENT:      Head: Normocephalic.      Right Ear: Tympanic membrane, ear canal and external ear normal. " There is no impacted cerumen.      Left Ear: Tympanic membrane, ear canal and external ear normal. There is no impacted cerumen.      Nose: No congestion or rhinorrhea.      Mouth/Throat:      Mouth: Mucous membranes are moist.      Pharynx: Oropharynx is clear. No oropharyngeal exudate or posterior oropharyngeal erythema.   Eyes:      General:         Right eye: No discharge.         Left eye: No discharge.      Extraocular Movements: Extraocular movements intact.      Conjunctiva/sclera: Conjunctivae normal.      Pupils: Pupils are equal, round, and reactive to light.   Cardiovascular:      Rate and Rhythm: Normal rate and regular rhythm.      Heart sounds: Normal heart sounds. No murmur heard.     No friction rub. No gallop.   Pulmonary:      Effort: Pulmonary effort is normal. No respiratory distress.      Breath sounds: Normal breath sounds. No wheezing.   Abdominal:      General: Bowel sounds are normal. There is no distension.      Palpations: Abdomen is soft. There is no mass.      Tenderness: There is no abdominal tenderness.   Musculoskeletal:         General: No swelling. Normal range of motion.      Cervical back: Normal range of motion. No tenderness.      Right lower leg: No edema.      Left lower leg: No edema.   Lymphadenopathy:      Cervical: No cervical adenopathy.   Skin:     Findings: No bruising, erythema or rash.   Neurological:      Mental Status: She is oriented to person, place, and time.      Gait: Gait normal.   Psychiatric:         Mood and Affect: Mood normal.         Behavior: Behavior normal.         Thought Content: Thought content normal.         Judgment: Judgment normal.         Diagnostic Data  Procedures    Assessment  Diagnoses and all orders for this visit:    1. Attention deficit (Primary)  -     Ambulatory Referral to Psychiatry    2. Iron deficiency    3. History of substance abuse    4. Annual physical exam  -     CBC (No Diff)  -     Comprehensive Metabolic Panel  -      TSH Rfx On Abnormal To Free T4  -     Hemoglobin A1c; Future  -     Lipid Panel        Plan    1. Attention deficit (Primary)- Ambulatory Referral to Psychiatry    2. Iron deficiency- on iron daily, repeat iron levels.    3. History of substance abuse- in remission.  Intent on sobriety.    4. Annual physical exam- obtain fasting labs and follow-up with these.  Advised on nutrition and exercise and follow-up with this.      Return in about 6 weeks (around 12/13/2023) for Recheck.    Joseph Vargas PA-C  11/01/2023

## 2023-11-02 ENCOUNTER — PATIENT ROUNDING (BHMG ONLY) (OUTPATIENT)
Dept: INTERNAL MEDICINE | Facility: CLINIC | Age: 28
End: 2023-11-02
Payer: MEDICAID

## 2023-11-02 LAB
ALBUMIN SERPL-MCNC: 4 G/DL (ref 3.5–5.2)
ALBUMIN/GLOB SERPL: 1.3 G/DL
ALP SERPL-CCNC: 133 U/L (ref 39–117)
ALT SERPL W P-5'-P-CCNC: 19 U/L (ref 1–33)
ANION GAP SERPL CALCULATED.3IONS-SCNC: 9 MMOL/L (ref 5–15)
AST SERPL-CCNC: 23 U/L (ref 1–32)
BILIRUB SERPL-MCNC: 0.3 MG/DL (ref 0–1.2)
BUN SERPL-MCNC: 13 MG/DL (ref 6–20)
BUN/CREAT SERPL: 11.4 (ref 7–25)
CALCIUM SPEC-SCNC: 9.6 MG/DL (ref 8.6–10.5)
CHLORIDE SERPL-SCNC: 109 MMOL/L (ref 98–107)
CHOLEST SERPL-MCNC: 199 MG/DL (ref 0–200)
CO2 SERPL-SCNC: 24 MMOL/L (ref 22–29)
CREAT SERPL-MCNC: 1.14 MG/DL (ref 0.57–1)
EGFRCR SERPLBLD CKD-EPI 2021: 68.2 ML/MIN/1.73
GLOBULIN UR ELPH-MCNC: 3.2 GM/DL
GLUCOSE SERPL-MCNC: 88 MG/DL (ref 65–99)
HDLC SERPL-MCNC: 53 MG/DL (ref 40–60)
IRON 24H UR-MRATE: 81 MCG/DL (ref 37–145)
IRON SATN MFR SERPL: 23 % (ref 20–50)
LDLC SERPL CALC-MCNC: 133 MG/DL (ref 0–100)
LDLC/HDLC SERPL: 2.49 {RATIO}
POTASSIUM SERPL-SCNC: 5 MMOL/L (ref 3.5–5.2)
PROT SERPL-MCNC: 7.2 G/DL (ref 6–8.5)
SODIUM SERPL-SCNC: 142 MMOL/L (ref 136–145)
TIBC SERPL-MCNC: 349 MCG/DL (ref 298–536)
TRANSFERRIN SERPL-MCNC: 234 MG/DL (ref 200–360)
TRIGL SERPL-MCNC: 70 MG/DL (ref 0–150)
TSH SERPL DL<=0.05 MIU/L-ACNC: 2.74 UIU/ML (ref 0.27–4.2)
VLDLC SERPL-MCNC: 13 MG/DL (ref 5–40)

## 2023-11-02 NOTE — PROGRESS NOTES
A  my chart message has been sent to the patient for patient rounding with [facility] [place].

## 2023-11-13 ENCOUNTER — TELEPHONE (OUTPATIENT)
Dept: PSYCHIATRY | Facility: CLINIC | Age: 28
End: 2023-11-13

## 2023-11-13 ENCOUNTER — TELEMEDICINE (OUTPATIENT)
Dept: PSYCHIATRY | Facility: CLINIC | Age: 28
End: 2023-11-13
Payer: MEDICAID

## 2023-11-13 DIAGNOSIS — R41.840 IMPAIRED CONCENTRATION: Primary | ICD-10-CM

## 2023-11-13 DIAGNOSIS — Z86.59 HISTORY OF ADHD: ICD-10-CM

## 2023-11-13 PROCEDURE — 1159F MED LIST DOCD IN RCRD: CPT | Performed by: NURSE PRACTITIONER

## 2023-11-13 PROCEDURE — 1160F RVW MEDS BY RX/DR IN RCRD: CPT | Performed by: NURSE PRACTITIONER

## 2023-11-13 PROCEDURE — 90792 PSYCH DIAG EVAL W/MED SRVCS: CPT | Performed by: NURSE PRACTITIONER

## 2023-11-13 NOTE — PROGRESS NOTES
This provider is located at the [facility] (through [facility]), [address], [facility], [address] using a secure [employer]hart Video Visit through [employer]. Patient is being seen remotely via telehealth at their home address in [place], and stated they are in a secure environment for this session. The patient's condition being diagnosed/treated is appropriate for telemedicine. The provider identified herself as well as her credentials.   The patient, and/or patients guardian, consent to be seen remotely, and when consent is given they understand that the consent allows for patient identifiable information to be sent to a third party as needed.   They may refuse to be seen remotely at any time. The electronic data is encrypted and password protected, and the patient and/or guardian has been advised of the potential risks to privacy not withstanding such measures.    You have chosen to receive care through a telehealth visit.  Do you consent to use a video/audio connection for your medical care today? Yes    Patient identifiers utilized: Name and date of birth.    Patient verbally confirmed consent for today's encounter  11/13/2023 .    The patient does verbally confirm they are being seen today while physically located in the [facility].  This provider/this APRN is licensed in the [facility] where the patient is located/being seen.           Irene Overton is a 28 y.o. female who presents today for initial evaluation     Chief Complaint: Impaired concentration, history of ADHD      History of Present Illness: The patient presents today for initial evaluation for medication management.  The patient was referred by PCP.  The patient states that she has a history of ADHD, which she states was diagnosed around age 16 or 17.  She states that she was under the care of a psychiatrist in [place] while living there, and that she completed psychological testing  resulting in initial diagnosis of ADHD at time.  She states that she was treated for ADHD until approximately age 20 when she finished high school.  She states that upon beginning college she relocated to [place] and did not reestablish care with a new provider in [place] to continue ADHD treatment.  She states that she has struggled with ADHD symptoms since early childhood, but states that symptoms were not problematic enough for her mother to seek treatment for her until she was around age 16 or 17.  She states that she responded well to ADHD treatment and that it was very effective for helping to control her symptoms.  She states that since she has not been rearranging her symptoms have been more difficult to manage, but states it has been especially difficult since starting back in college and having her child.  She states that she has significant difficulty with completing tasks as she endorses that she gets distracted while working on something and then never finishes it.  She states that it is causing her difficulty both with school and home life.  She states that she is currently working on finishing up her bachelor's degree and that she is set to graduate in May 2024.  The patient rates her difficulties with concentration at a 9/10 on a 0-10 scale, with 10 being the worst.  The patient states that she has a history of depression, but states that she thinks this was situational when it occurred.  The patient states that she began experiencing some depressive symptoms shortly after relocating to [place] to begin college.  She states that she thinks her depressive symptoms began as a result of this big life change and moving away from family.  She reports that she was treated with Wellbutrin at that time and that it was very effective for her.  She states that she has not experienced any depressive symptoms since that one episode, and denies any depressive symptoms currently.  The patient denies any anxiety  symptoms.  The patient denies any abnormal muscle movements or tics.  The patient denies suicidal ideations and homicidal ideations, and is convincing.  The patient denies any auditory hallucinations or visual hallucinations.  The patient does not endorse significant symptoms consistent with bipolar disorder or a psychotic illness.             Current Psychiatric Medications:  None    Prior Psychiatric Medications:  Adderall - report she was prescribed this for ADHD in the past.  Reports it was effective and well-tolerated.  Reports she has not been prescribed this medication since around 2015 when she finished high school.   Wellbutrin - report she was prescribed this for depression in the past.  Reports it was effective and well-tolerated.     Currently in Counseling or Therapy:  Denies    Prior Psychiatric Outpatient Care:  Reports that she was establishing care with a psychiatrist in [place] from approximately age 11 until approximately age 20 when she finished high school and relocated to [place] for college.    Prior Psychiatric Hospitalizations:  Denies    Previous Suicide Attempts:  Denies    Previous Self-Harming Behavior:  Denies    Any family history of suicide attempts:  Denies    Legal History, Arrests, or Incarcerations:  No current legal charges pending.  Denies any of arrests or incarcerations.     History of Seizures or TBI:  Denies    Highest Level of Education:  Graduated high school.  Reports she is currently enrolled at the [employer] and is set to graduate in May 2024 with her bachelors degree in liberal studies.     Employment:  Unemployed.  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  Smokeless Tobacco: Denies  Substance  Use: Reports a previous history of  "substance use.  Reports that she abused \"painkillers\" for approximately 8 months.  Reports that this was a very hard time in her life that she met the wrong people in college and started going down the wrong path.  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    Social History:  Born: [place], [place]   Raised: [place]   Currently resides in [place], [place].  Reports she has been living in [place] since 2015 when she relocated to [place] for college.   Marriage status: Single   Children: One son, two months old   Lives with: The patient's currently household consists of the patient, her fiance, and their son.     Trauma/Abuse History:  Verbal: Denies  Emotional: Denies  Mental: Denies  Physical: Denies  Sexual: Denies  Rape: Denies  Other: Denies    Patient's Support Network Includes:   parents, sister, fiance, friends    Last Menstrual Period:  Reports she has not had menstrual cycles since she was pregnant.  Reports she is currently breastfeeding her 2-month-old.   The patient was educated that her prescribed medications can have potential risk to a developing fetus. The patient is advised to contact this APRN/this office if she becomes pregnant or plans to become pregnant.  Pt verbalizes understanding and acknowledged agreement with this plan in her own words.         The following portions of the patient's history were reviewed and updated as appropriate: allergies, current medications, past family history, past medical history, past social history, past surgical history and problem list.          Past Medical History:  Past Medical History:   Diagnosis Date    ADHD (attention deficit hyperactivity disorder)     Depression     Herpes     History of substance abuse     sober for 6 months    HPV (human papilloma virus) infection     Preeclampsia in postpartum period        Social " History:  Social History     Socioeconomic History    Marital status: Single   Tobacco Use    Smoking status: Former     Types: Electronic Cigarette     Quit date: 3/13/2023     Years since quittin.6    Smokeless tobacco: Never    Tobacco comments:     Vape   Vaping Use    Vaping Use: Former    Substances: Nicotine, Flavoring    Devices: Disposable   Substance and Sexual Activity    Alcohol use: Not Currently    Drug use: Not Currently     Frequency: 7.0 times per week     Types: Fentanyl     Comment: sober for 1.5 years, on suboxone    Sexual activity: Yes     Partners: Male     Birth control/protection: None       Family History:  Family History   Problem Relation Age of Onset    Depression Sister     Bipolar disorder Maternal Uncle     Breast cancer Neg Hx     Ovarian cancer Neg Hx     Uterine cancer Neg Hx     Colon cancer Neg Hx        Past Surgical History:  Past Surgical History:   Procedure Laterality Date    APPENDECTOMY         Problem List:  Patient Active Problem List   Diagnosis    History of substance abuse    Tobacco use during pregnancy, antepartum       Allergy:   No Known Allergies     Current Medications:   Current Outpatient Medications   Medication Sig Dispense Refill    buprenorphine (SUBUTEX) 8 MG sublingual tablet SL tablet       ferrous sulfate 325 (65 FE) MG tablet Take 1 tablet by mouth Daily With Breakfast.      Prenatal Vit-Fe Fumarate-FA (prenatal vitamin 28-0.8) 28-0.8 MG tablet tablet Take 1 tablet by mouth Daily.       No current facility-administered medications for this visit.       Review of Symptoms:    Review of Systems   Constitutional:  Negative for activity change, appetite change, fatigue, unexpected weight gain and unexpected weight loss.   Psychiatric/Behavioral:  Positive for decreased concentration. Negative for agitation, behavioral problems, dysphoric mood, hallucinations, self-injury, sleep disturbance, suicidal ideas, negative for hyperactivity, depressed mood  and stress. The patient is not nervous/anxious.          Physical Exam:   Last menstrual period 12/27/2022, currently breastfeeding. There is no height or weight on file to calculate BMI.     The patient was seen remotely today via a MyChart Video Visit through [facility] [place] [facility].  Unable to obtain vital signs due to nature of remote visit.  Height stated at 64 inches.  Weight stated at 191 pounds.     Physical Exam  Constitutional:       Appearance: Normal appearance.   Neurological:      Mental Status: She is alert.   Psychiatric:         Attention and Perception: Perception normal. She is inattentive.         Mood and Affect: Mood and affect normal.         Speech: Speech normal.         Behavior: Behavior normal. Behavior is cooperative.         Thought Content: Thought content normal. Thought content does not include homicidal or suicidal ideation. Thought content does not include homicidal or suicidal plan.         Cognition and Memory: Cognition and memory normal.         Judgment: Judgment normal.           Mental Status Exam:   Hygiene:   good  Cooperation:  Cooperative  Eye Contact:  Good  Psychomotor Behavior:  Appropriate  Affect:  Full range  Mood: normal  Hopelessness: Denies  Speech:  Normal  Thought Process:  Goal directed and Linear  Thought Content:  Normal  Suicidal:  None  Homicidal:  None  Hallucinations:  None  Delusion:  None  Memory:  Intact  Orientation:  Person, Place, Time, and Situation  Reliability:  good  Insight:  Good  Judgement:  Good  Impulse Control:  Good  Physical/Medical Issues:  Yes See medical history            PHQ-9 Depression Screening  Little interest or pleasure in doing things? (P) 0-->not at all   Feeling down, depressed, or hopeless? (P) 0-->not at all   Trouble falling or staying asleep, or sleeping too much? (P) 0-->not at all   Feeling tired or having little energy? (P) 0-->not at all   Poor appetite or overeating? (P) 0-->not at all   Feeling bad about yourself - or that you are  a failure or have let yourself or your family down? (P) 0-->not at all   Trouble concentrating on things, such as reading the newspaper or watching television? (P) 0-->not at all   Moving or speaking so slowly that other people could have noticed? Or the opposite - being so fidgety or restless that you have been moving around a lot more than usual? (P) 0-->not at all   Thoughts that you would be better off dead, or of hurting yourself in some way? (P) 0-->not at all   PHQ-9 Total Score (P) 0   If you checked off any problems, how difficult have these problems made it for you to do your work, take care of things at home, or get along with other people? (P) not difficult at all       LONG-7  Feeling nervous, anxious or on edge: (P) Not at all  Not being able to stop or control worrying: (P) Not at all  Worrying too much about different things: (P) Several days  Trouble Relaxing: (P) Not at all  Being so restless that it is hard to sit still: (P) Not at all  Feeling afraid as if something awful might happen: (P) Not at all  Becoming easily annoyed or irritable: (P) Several days  LONG 7 Total Score: (P) 2  If you checked any problems, how difficult have these problems made it for you to do your work, take care of things at home, or get along with other people: (P) Not difficult at all      PROMIS scale screening tool that patient filled out virtually reviewed by this APRN at today's encounter.     The YAZMIN report, request number [id number], of the past 12 months were reviewed.    Past available provider notes reviewed by this APRN at today's encounter.         Lab Results:   Lab on 11/01/2023   Component Date Value Ref Range Status    Hemoglobin A1C 11/01/2023 5.40  4.80 - 5.60 % Final    Iron 11/01/2023 81  37 - 145 mcg/dL Final    Iron Saturation (TSAT) 11/01/2023 23  20 - 50 % Final    Transferrin 11/01/2023 234  200 - 360 mg/dL Final    TIBC 11/01/2023 349  298 - 536 mcg/dL Final   Office Visit on 11/01/2023    Component Date Value Ref Range Status    WBC 11/01/2023 6.89  3.40 - 10.80 10*3/mm3 Final    RBC 11/01/2023 4.75  3.77 - 5.28 10*6/mm3 Final    Hemoglobin 11/01/2023 14.2  12.0 - 15.9 g/dL Final    Hematocrit 11/01/2023 42.6  34.0 - 46.6 % Final    MCV 11/01/2023 89.7  79.0 - 97.0 fL Final    MCH 11/01/2023 29.9  26.6 - 33.0 pg Final    MCHC 11/01/2023 33.3  31.5 - 35.7 g/dL Final    RDW 11/01/2023 12.8  12.3 - 15.4 % Final    RDW-SD 11/01/2023 41.7  37.0 - 54.0 fl Final    MPV 11/01/2023 11.2  6.0 - 12.0 fL Final    Platelets 11/01/2023 214  140 - 450 10*3/mm3 Final    Glucose 11/01/2023 88  65 - 99 mg/dL Final    BUN 11/01/2023 13  6 - 20 mg/dL Final    Creatinine 11/01/2023 1.14 (H)  0.57 - 1.00 mg/dL Final    Sodium 11/01/2023 142  136 - 145 mmol/L Final    Potassium 11/01/2023 5.0  3.5 - 5.2 mmol/L Final    Chloride 11/01/2023 109 (H)  98 - 107 mmol/L Final    CO2 11/01/2023 24.0  22.0 - 29.0 mmol/L Final    Calcium 11/01/2023 9.6  8.6 - 10.5 mg/dL Final    Total Protein 11/01/2023 7.2  6.0 - 8.5 g/dL Final    Albumin 11/01/2023 4.0  3.5 - 5.2 g/dL Final    ALT (SGPT) 11/01/2023 19  1 - 33 U/L Final    AST (SGOT) 11/01/2023 23  1 - 32 U/L Final    Alkaline Phosphatase 11/01/2023 133 (H)  39 - 117 U/L Final    Total Bilirubin 11/01/2023 0.3  0.0 - 1.2 mg/dL Final    Globulin 11/01/2023 3.2  gm/dL Final    A/G Ratio 11/01/2023 1.3  g/dL Final    BUN/Creatinine Ratio 11/01/2023 11.4  7.0 - 25.0 Final    Anion Gap 11/01/2023 9.0  5.0 - 15.0 mmol/L Final    eGFR 11/01/2023 68.2  >60.0 mL/min/1.73 Final    TSH 11/01/2023 2.740  0.270 - 4.200 uIU/mL Final    Total Cholesterol 11/01/2023 199  0 - 200 mg/dL Final    Triglycerides 11/01/2023 70  0 - 150 mg/dL Final    HDL Cholesterol 11/01/2023 53  40 - 60 mg/dL Final    LDL Cholesterol  11/01/2023 133 (H)  0 - 100 mg/dL Final    VLDL Cholesterol 11/01/2023 13  5 - 40 mg/dL Final    LDL/HDL Ratio 11/01/2023 2.49   Final         Assessment & Plan   Diagnoses and all  orders for this visit:    1. Impaired concentration (Primary)    2. History of ADHD        Visit Diagnoses:    ICD-10-CM ICD-9-CM   1. Impaired concentration  R41.840 799.51   2. History of ADHD  Z86.59 V11.8          GOALS:  Short Term Goals: Patient will be compliant with medication, and patient will have no significant medication related side effects.  Patient will be engaged in psychotherapy as indicated.  Patient will report subjective improvement of symptoms.  Long term goals: To stabilize mood and treat/improve subjective symptoms, the patient will stay out of the hospital, the patient will be at an optimal level of functioning, and the patient will take all medications as prescribed.  The patient verbalized understanding and agreement with goals that were mutually set.      SUICIDE RISK ASSESSMENT: Unalterable demographics and a history of mental health intervention indicate this patient is in a high risk category compared to the general population. At present, the patient denies active SI/HI, intentions, or plans at this time and agrees to seek immediate care should such thoughts develop. The patient verbalizes understanding of how to access emergency care if needed and agrees to do so. Consideration of suicide risk and protective factors such as history, current presentation, individual strengths and weaknesses, psychosocial and environmental stressors and variables, psychiatric illness and symptoms, medical conditions and pain, took place in this interview. Based on those considerations, the patient is determined: within individual baseline and presenting no imminent risk for suicide or homicide. Other recommendations: The patient does not meet the criteria for inpatient admission and is not a safety risk to self or others at today's visit. Inpatient treatment offers no significant advantages over outpatient treatment for this patient at today's visit.      SAFETY PLAN:  Patient was given ample time for  questions and fully participated in treatment planning.  Patient was encouraged to call the clinic with any questions or concerns.  Patient was informed of access to emergency care. If patient were to develop any significant symptomatology, suicidal ideation, homicidal ideation, any concerns, or feel unsafe at any time they are to call the clinic and if unable to get immediate assistance should immediately call 911 or go to the nearest emergency room.  The patient is advised to remove or secure (lock away) all lethal weapons (including guns) and sharps (including razors, scissors, knives, etc.).  All medications (including any prescribed and any over the counter medications) should be stored in a safe and secured location that is not obtainable by children/adolescents.  Patient was given an opportunity and encouraged to ask questions about their medication, illness, and treatment. Patient contracted verbally for the following: If you are experiencing an emotional crisis or have thoughts of harming yourself or others, please go to your nearest local emergency room or call 911. Will continue to re-assess medication response and side effects frequently to establish efficacy and ensure safety. Risks, any black box warnings, side effects, off label usage, and benefits of medication and treatment discussed with patient, along with potential adverse side effects of current and/or newly prescribed medication, alternative treatment options, and OTC medications.  Patient verbalized understanding of potential risks, any off label use of medication, any black box warnings, and any side effects in their own words. The patient verbalized understanding and agreed to comply with the safety plan discussed in their own words.  Patient given the number to the office. Number also available to the 24- hour suicide hotline.       TREATMENT PLAN: Continue supportive psychotherapy efforts and medications as indicated.  Medication and  treatment options, both pharmacological and non-pharmacological treatment options, discussed during today's visit, including any off label use of medication. Patient acknowledged and verbally consented with current treatment plan and was educated on the importance of compliance with treatment and follow-up appointments.          -The patient requests evaluation and potential treatment for the patient's concern of history of ADHD.  Discussed with the patient that to continue with testing and potential future treatment for patient's concern of possible ADHD the patient will need to complete further psychological testing through a Psychologist for more in-depth definitive testing and to rule out other differential diagnoses that could potentially be contributing to the patient's symptomology.  The patient reports that she has completed psychological testing in the past with her previous psychiatric provider when the initial diagnosis of ADHD was made at approximately age 16 or 17.  Discussed with the patient that medical records from previous psychiatric provider will be requested with the patient's consent at this time.  Discussed with the patient that support staff will contact the patient at the conclusion of today's encounter to obtain her consent and signature on the request of information forms to be sent to previous provider and obtain past medical records.  The patient verbalizes understanding her own words and endorses that she is agreeable to this.  Discussed with the patient that in the event that psychological testing was not completed with previous psychiatric provider it will need to be completed at this time for confirmation/verification of diagnosis prior to initiating ADHD treatment.  The patient has been advised to contact their insurance company and request a list of preferred Psychologists in the patient's area that is covered under the patient's current insurance plan for a psychological evaluation  in the event that this is necessary.    -Discussed with the patient that the Biden Administration announced on January 30, 2023 that the national and public health emergencies (PHE) would end on May 11, 2023.  During the PHE a portion of the [place] Act was waived, allowing controlled substances to be provided via telemedicine without in person encounters.  The Drug Enforcement Agency (OTILIO) acknowledged in the absence of an extension of this ruling backlogs for in-person evaluations might result.  On May 10, 2023, the OTILIO, jointly with the Substance Abuse and Mental Health Services Administration (SAMHSA), issued a temporary rule to extend certain exceptions granted during the PHE in order to avoid lapses in care for patients.  When this extension ends the [place] Act will go back into full effect, meaning that prescriptions for controlled substances can not be given via telemedicine without in person encounters and meeting all requirements of the [place] Act.  The [facility] is strictly a telemedicine clinic and cannot offer the patient an in-person evaluation to be compliant with the [place] Act as it goes back into effect.  Unless there are legislative changes prior to the ending of this extension, the patient will not be able to obtain prescription(s) for controlled substance from this APRN/the [facility].  Discussed with the patient that this APRN is not currently taking on the prescribing of any new controlled substance(s) due to the provisionary/temporary nature of the extension of COVID-19 telemedicine flexibilities for the prescription of controlled medications.  Discussed with the patient that if psychological testing determines that they have ADHD we will treat with non-pharmacological as well as non-controlled substance options if appropriate as no new prescription(s) for a controlled substances will be  prescribed as this APRN cannot ensure appropriate continuity of care, and prevention of a lapse in care, when this temporary extension ends.  Discussed with the patient that if at any point treatment necessitates prescribing of a controlled substance they will need to establish care with in-person behavioral health services to pursue this.  Discussed with the patient that in the event that this is necessary they can discuss their referral to behavioral health with their primary care provider in effort for their referral to be changed to a local clinic in their area that offers in-person behavioral health services, or they can also seek in person services on their own by calling their insurance company and requesting a list of covered behavioral health providers under their service plan that are in their local area and offer in-person services.  The patient verbalizes understanding in their own words.    -Discussed with the patient that we will plan for follow-up visit with this APRN approximately 4 weeks from now to ensure that we have past medical records for review at next follow-up visit.  Discussed with patient that at next follow-up visit we will plan to discuss past medical records, including patient's report of past psychological testing, and potential treatment options once previous diagnosis of ADHD has been confirmed.  Discussed with patient that we will plan to discuss her current breast-feeding status and how long she intends to continue breast-feeding as this may affect the treatment/medication options that can be utilized for treatment of ADHD once this diagnosis has been confirmed.  The patient verbalizes understanding in her own words and endorses that she is agreeable to this.        MEDICATION ISSUES: None - no medications prescribed at today's encounter                 VERBAL INFORMED CONSENT FOR MEDICATION:  The patient was educated that their proposed/prescribed psychotropic medication(s) has  potential risks, side effects, adverse effects, and black box warnings; and these have been discussed with the patient.  The patient has been informed that their treatment and medication dosage is to be individualized, and may even be above or below the recommended range/dosage due to patient individualization and response, but medication is prescribed using a shared decision making approach, and no medication or dosage will be prescribed without the patient's verbal consent.  The reason for the use of the medication including any off label use and alternative modes of treatment other than or in addition to medication has been considered and discussed, the probable consequences of not receiving the proposed treatment have been discussed, and any treatment side effects, black box warnings, and cautions associated with treatment have been discussed with the patient.  The patient is allowed ample time to openly discuss and ask questions regarding the proposed medication(s) and treatment plan and the patient verbalizes understanding the reasons for the use of the medication, its potential risks and benefits, other alternative treatment(s), and the probable consequences that may occur if the proposed medication is not given.  The patient has been given ample time to ask questions and study the information and find the information to be specific, accurate, and complete.  The patient gives verbal consent for the medication(s) proposed/prescribed, they verbalized understanding that they can refuse and withdraw consent at any time with the assistance of this APRN, and the patient has verbally confirmed that they are aware, and are willing, to take the prescribed medication and follow the treatment plan with the known possible risks, side effect, black box warnings, and any potential medication interactions, and the patient reports they will be worse off without this medication and treatment plan.  The patient is advised to  contact this APRN/this office if any questions or concerns arise at any time (at [phone]), or call 911/go to the closest emergency department if needed or outside of office hours.         [facility] No Show Policy:  We understand unexpected circumstances arise; however, anytime you miss your appointment we are unable to provide you appropriate care.  In addition, each appointment missed could have been used to provide care for others.  We ask that you call at least 24 hours in advance to cancel or reschedule an appointment.  We would like to take this opportunity to remind you of our policy stating patients who miss THREE or more appointments without cancelling or rescheduling 24 hours in advance of the appointment may be subject to cancellation of any further visits with our clinic and recommendation to seek in-person services/visits.    Please call [phone] to reschedule your appointment. If there are reasons that make it difficult for you to keep the appointments, please call and let us know how we can help.  Please understand that medication prescribing will not continue without seeing your provider.      [facility]'s No Show Policy reviewed with patient at today's visit. Patient verbalized understanding of this policy. Discussed with patient that in the event that there are three or more no show visits, it will be recommended that they pursue in-person services/visits as noncompliance with telehealth visits indicates that patient is not an appropriate candidate for telemedicine and would likely be more appropriate for in-person services/visits. Patient verbalizes understanding and is agreeable to this.             MEDS ORDERED DURING VISIT:  No orders of the defined types were placed in this encounter.      Return in about 4 weeks (around 12/11/2023), or if symptoms worsen or fail to improve, for Recheck.       Patient will follow-up in 4 weeks, highly  encouraged the patient if she had any questions or concerns to contact the behavioral health [facility] clinic for sooner appointment patient verbalized understanding.                This document has been electronically signed by ELOY Sutton  November 13, 2023 09:37 EST    Part of this note may be an electronic transcription/translation of spoken language to printed text using the Dragon Dictation System.

## 2023-11-13 NOTE — TELEPHONE ENCOUNTER
Faxed medical records release to [facility] Primary Provider Lauren Rodriguez per request of Anya Saucedo.  Fax number is [phone] and phone number is [phone]

## 2023-11-30 ENCOUNTER — POSTPARTUM VISIT (OUTPATIENT)
Dept: OBSTETRICS AND GYNECOLOGY | Facility: CLINIC | Age: 28
End: 2023-11-30
Payer: MEDICAID

## 2023-11-30 VITALS — WEIGHT: 197 LBS | DIASTOLIC BLOOD PRESSURE: 74 MMHG | SYSTOLIC BLOOD PRESSURE: 122 MMHG | BODY MASS INDEX: 33.8 KG/M2

## 2023-11-30 DIAGNOSIS — Z97.5 INTRAUTERINE DEVICE: Primary | ICD-10-CM

## 2023-11-30 RX ORDER — PNV NO.95/FERROUS FUM/FOLIC AC 28MG-0.8MG
1 TABLET ORAL DAILY
Qty: 90 TABLET | Refills: 3 | Status: SHIPPED | OUTPATIENT
Start: 2023-11-30

## 2023-11-30 NOTE — PROGRESS NOTES
Chief Complaint   Patient presents with    Postpartum Care    Contraception       Postpartum Visit         Marie Overton is a 28 y.o.  who presents today for a 9 week(s) postpartum check.            Vaginal, Spontaneous    Information for the patient's :  George Mejía [[id number]]   2023   male   George Mejía   3140 g (6 lb 14.8 oz)   Gestational Age: 39w0d    Baby Discharged: Discharged with Mom  Delivering Physician: Tamiko Heath MD    At the time of delivery were you diagnosed with any of the following: PP preclampsia. The patient reports she has not been checking her BP at home but feels well and denies headaches. The laceration was 1st degree and is healing well. Patient describes vaginal bleeding as absent.  Patient is breast and bottle feeding.  She desires contraceptive methods: IUD.  Insertion date: 23  for contraception.  Patient denies bowel or bladder issues. The patient denies post partum depression and scored 0 on post partum depression screening.       Last Pap : 22. Results: ASCUS. HPV: HPV pool +.   Last Completed Pap Smear            PAP SMEAR (Yearly) Next due on 2022  LIQUID-BASED PAP SMEAR, [facility] ([facility])    2020  Done - [place]- ASCUS-HPV non 16/18 positive                      The additional following portions of the patient's history were reviewed and updated as appropriate: allergies, current medications, and problem list.    Review of Systems  All other systems reviewed and are negative.     I have reviewed and agree with the HPI, ROS, and historical information as entered above. Tamiko Heath MD      /74   Wt 89.4 kg (197 lb)   Breastfeeding Yes   BMI 33.80 kg/m²     Physical Exam  Vitals and nursing note reviewed. Exam conducted with a chaperone present.   Constitutional:       Appearance: She is well-developed.   HENT:      Head: Normocephalic and atraumatic.   Neck:      Thyroid: No thyroid  mass or thyromegaly.   Pulmonary:      Breath sounds: No rhonchi.   Abdominal:      Palpations: Abdomen is soft. Abdomen is not rigid. There is no mass.      Tenderness: There is no abdominal tenderness. There is no guarding.      Hernia: No hernia is present.   Genitourinary:     Vagina: Normal.      Cervix: Normal.      Uterus: Normal.    Musculoskeletal:      Cervical back: Normal range of motion. No muscular tenderness.   Neurological:      Mental Status: She is alert and oriented to person, place, and time.   Psychiatric:         Behavior: Behavior normal.             Assessment and Plan    Problem List Items Addressed This Visit    None  Visit Diagnoses       Intrauterine device    -  Primary    Relevant Medications    Levonorgestrel (MIRENA) 20 MCG/DAY IUD intrauterine device 1 each (Start on 11/30/2023  6:00 PM)    Other Relevant Orders    POC Pregnancy, Urine (Completed)    Encounter for insertion of intrauterine contraceptive device (IUD)        Postpartum exam                S/p Vaginal delivery, 10 week(s) postpartum.  Doing well.    Return to normal physical activity.  No pelvic restrictions.   Baby doing well.  Breastfeeding going well.  No si/sx of postpartum depression  Contraception: contraceptive methods: IUD.  Insertion date: to be sched. She desired today, but had unprotected intercourse yesterday. Will use condoms/abstinance for 2 weeks and plan insertion then.   Return in about 2 weeks (around 12/14/2023) for IUD Insertion.     Tamiko Heath MD  11/30/2023

## 2023-12-06 RX ORDER — NIFEDIPINE 30 MG/1
30 TABLET, EXTENDED RELEASE ORAL DAILY
Qty: 30 TABLET | Refills: 1 | OUTPATIENT
Start: 2023-12-06

## 2023-12-11 ENCOUNTER — TELEPHONE (OUTPATIENT)
Dept: PSYCHIATRY | Facility: CLINIC | Age: 28
End: 2023-12-11

## 2023-12-11 ENCOUNTER — DOCUMENTATION (OUTPATIENT)
Dept: PSYCHIATRY | Facility: CLINIC | Age: 28
End: 2023-12-11

## 2023-12-11 NOTE — PROGRESS NOTES
The patient was scheduled for a follow-up appointment today with this APRN to review previous medical records, including psychological testing, which the patient reported she has completed with previous psychiatric provider.  Request for records was sent to the patient's previous provider after last visit with this APRN with the patient's consent, but no records have been received yet.  Support staff were instructed to contact the patient to inform her that past medical records have not yet been received and that today's appointment would need to be cancelled as this was needed for review with the patient for today's appointment.  Also informed support staff to let the patient know that we could contact her to be rescheduled for a follow-up appointment with this APRN once past medical records, including psychological testing, have been received and reviewed by this APRN.  Instructed support staff to also notify the patient that she can attempt to obtain her past medical records, including psychological testing, from previous psychiatric provider as well and send to this APRN as this may help to expedite the process.  The patient requested that support schedule her a follow-up appointment for next week with this APRN, and states that she plans to contact previous psychiatric provider/clinic today in effort to obtain past medical records.  Support staff scheduled the patient for follow-up appointment with this APRN next week per patient request, but informed the patient that if past medical records, including psychological testing, had not been received for review by this APRN prior to next scheduled follow-up appointment it would also need to be cancelled until records have been received and reviewed.  Support staff report that the patient verbalized understanding in her own words and endorsed that she is agreeable to this.

## 2023-12-11 NOTE — TELEPHONE ENCOUNTER
Patient is aware and states she has already started calling around to find someone to do the psychological testing.  Patient's appointment has been cancelled out and she will call once the results have been faxed to our office to continue her care with provider Anya Saucedo.

## 2023-12-11 NOTE — TELEPHONE ENCOUNTER
Patient called stating that prior provider's office no longer has a copy of her psychological testing results as it has been more than 10 years ago.  Please advise.

## 2023-12-14 ENCOUNTER — OFFICE VISIT (OUTPATIENT)
Dept: OBSTETRICS AND GYNECOLOGY | Facility: CLINIC | Age: 28
End: 2023-12-14
Payer: MEDICAID

## 2023-12-14 VITALS
BODY MASS INDEX: 32.61 KG/M2 | HEIGHT: 64 IN | WEIGHT: 191 LBS | DIASTOLIC BLOOD PRESSURE: 80 MMHG | SYSTOLIC BLOOD PRESSURE: 110 MMHG

## 2023-12-14 DIAGNOSIS — Z97.5 INTRAUTERINE DEVICE: Primary | ICD-10-CM

## 2023-12-14 DIAGNOSIS — Z30.430 ENCOUNTER FOR INSERTION OF INTRAUTERINE CONTRACEPTIVE DEVICE (IUD): ICD-10-CM

## 2023-12-14 NOTE — PROGRESS NOTES
"     Gynecologic Procedure Note        Procedure: IUD Insertion     Procedures    Pre procedure indication 1) Desires Mirena  Post procedure indication 1) Desires Mirena    NDC: Mirena 35498-628-26  Lot #: [id number]  Exp Date: 01/2026   BH device    /80   Ht 162.6 cm (64\")   Wt 86.6 kg (191 lb)   LMP 12/10/2023   Breastfeeding Yes   BMI 32.79 kg/m²       The risks, benefits, and alternatives to Mirena were explained at length with the patient. All her questions were answered and consents were signed.  Her LMP was 12- .  Urine Pregnancy Test was Negative.  Patient does not have an allergy to betadine or shellfish.    Time out: immediate members of the procedure team and patient agree to the following: correct patient, correct site, correct procedure to be performed. Tamiko Heath MD      The patient was placed in a dorsal lithotomy position on the examining table in [facility]. A bimanual exam confirmed the uterus was anteverted. A speculum was inserted into the vagina and the cervix was brought into view.  The cervix was prepped with Betadine. The anterior lip of the cervix was then grasped with a single-tooth tenaculum. The endometrial cavity was then sounded to 7 centimeters. The sealed Mirena package was opened and the IUD was removed in a sterile fashion.    The upper edge of the depth setting the flange was set at the uterine sound measurement. The  was then carefully advanced to the cervical canal into the uterus to the level of the fundus.  The slider was then retracted about 1 cm and deployed the device. The device was then gently advanced to the fundus. The IUD was then released by pulling the slider down all the way. The  was removed carefully from the uterus. The threads were then cut leaving 2-3 cm visible outside of the cervix.  The single-tooth tenaculum was removed from the anterior lip. Good hemostasis was noted.   All other instruments were removed from the vagina. "       The patient tolerated the procedure well with a mild amount of discomfort.  She was monitored for 10 minutes prior to discharge.      There were no complications.    The patient was counseled about the need to return in 4 weeks for IUD check.     She was counseled about the need to use a backup method of contraception such as condoms until her post insertion exam was performed. The patient verbalized understanding when the IUD will need to be removed/replaced. Written information was given to the patient.  The patient is counseled to contact us if she has any significant or increasing bleeding, pain, fever, chills, or other concerns. She is instructed to see a doctor right away if she believes that she may be pregnant at any time with the IUD in place.    Tamiko Heath MD  12/14/2023

## 2024-01-16 ENCOUNTER — OFFICE VISIT (OUTPATIENT)
Dept: OBSTETRICS AND GYNECOLOGY | Facility: CLINIC | Age: 29
End: 2024-01-16
Payer: MEDICAID

## 2024-01-16 VITALS
DIASTOLIC BLOOD PRESSURE: 70 MMHG | WEIGHT: 191.4 LBS | BODY MASS INDEX: 32.68 KG/M2 | HEIGHT: 64 IN | SYSTOLIC BLOOD PRESSURE: 100 MMHG

## 2024-01-16 DIAGNOSIS — Z87.42 H/O ABNORMAL CERVICAL PAPANICOLAOU SMEAR: Primary | ICD-10-CM

## 2024-01-16 DIAGNOSIS — Z30.431 IUD CHECK UP: ICD-10-CM

## 2024-01-16 NOTE — PROGRESS NOTES
"    Chief Complaint   Patient presents with    Follow-up     IUD check          Subjective   HPI  Marie Overton is a 28 y.o. female, , who presents for IUD check follow up.  She had a Mirena placed on 23. Since the IUD placement, the patient has not had any unusual complaints. She has had light bleeding almost daily. Patient is due for a pap smear today as well.     The additional following portions of the patient's history were reviewed and updated as appropriate: allergies, current medications, past family history, past medical history, past social history, past surgical history, and problem list.    Did the patient have u/s today? Yes.  Findings showed IUD had correct placement and 16.3 mm simple cyst on (L) ovary.  I have personally evaluated the U/S and agree with the findings.   Review of Systems  All other systems reviewed and are negative.     I have reviewed and agree with the HPI, ROS, and historical information as entered above. Tamiko Heath MD      Objective   /70   Ht 162.6 cm (64\")   Wt 86.8 kg (191 lb 6.4 oz)   BMI 32.85 kg/m²     Physical Exam  Vitals and nursing note reviewed. Exam conducted with a chaperone present.   Constitutional:       Appearance: Normal appearance. She is well-developed.   HENT:      Head: Normocephalic and atraumatic.      Nose: Nose normal. No congestion or rhinorrhea.   Eyes:      General: No scleral icterus.     Pupils: Pupils are equal, round, and reactive to light.   Pulmonary:      Effort: Pulmonary effort is normal.      Breath sounds: Normal breath sounds.   Abdominal:      General: There is no distension.      Palpations: Abdomen is soft.      Tenderness: There is no abdominal tenderness. There is no guarding or rebound.   Genitourinary:     General: Normal vulva.      Exam position: Lithotomy position.      Labia:         Right: No rash or lesion.         Left: No rash or lesion.       Urethra: No prolapse.      Vagina: Normal.      Cervix: No " cervical motion tenderness, discharge, lesion or cervical bleeding.      Uterus: Normal. Not tender.       Adnexa: Right adnexa normal and left adnexa normal.        Right: No tenderness.          Left: No tenderness.        Rectum: Normal.   Musculoskeletal:         General: No swelling. Normal range of motion.      Cervical back: Normal range of motion and neck supple.   Skin:     General: Skin is warm and dry.   Neurological:      General: No focal deficit present.      Mental Status: She is alert and oriented to person, place, and time.   Psychiatric:         Mood and Affect: Mood normal.         Behavior: Behavior normal. Behavior is cooperative.     Strings seen    Assessment & Plan     Assessment     Problem List Items Addressed This Visit    None  Visit Diagnoses       H/O abnormal cervical Papanicolaou smear    -  Primary    Relevant Orders    LIQUID-BASED PAP SMEAR WITH HPV GENOTYPING REGARDLESS OF INTERPRETATION ([facility])    IUD check up                Iud in correct position on US today and she has no complaints.     Plan     Return to office PRN  Pap today  Return for Annual physical.  I spent 20 minutes caring for Marie on this date of service. This time includes time spent by me in the following activities:preparing for the visit, reviewing tests, obtaining and/or reviewing a separately obtained history, ordering medications, tests, or procedures, and documenting information in the medical record        Tamiko Heath MD  01/16/2024

## 2024-01-19 ENCOUNTER — TELEPHONE (OUTPATIENT)
Dept: OBSTETRICS AND GYNECOLOGY | Facility: CLINIC | Age: 29
End: 2024-01-19
Payer: MEDICAID

## 2024-01-19 ENCOUNTER — TELEPHONE (OUTPATIENT)
Dept: OBSTETRICS AND GYNECOLOGY | Facility: CLINIC | Age: 29
End: 2024-01-19

## 2024-01-19 LAB — REF LAB TEST METHOD: NORMAL

## 2024-01-19 NOTE — TELEPHONE ENCOUNTER
Spoke with patient who reports she is getting  in March and wondered if she could have botox, filler, dysport, or other cosmetic injectables while breast feeding. Spoke with NP who advised that there is not enough research to confirm if those procedures are safe while breastfeeding. NP advised against it until pt has weaned baby from BF. Spoke with patient and advised that she should wait until she has weaned baby from BF per NP recommendation. Patient v/u.

## 2024-01-19 NOTE — TELEPHONE ENCOUNTER
PATIENT SAID HER DERMATOLOGIST IS WANTING TO KNOW IF BOTOX, FILLER, DYSPORT IS SAFE FOR PATIENT TO GET WHILE BREAST FEEDING.

## 2024-01-19 NOTE — TELEPHONE ENCOUNTER
Caller: Marie Overton    Relationship: Self    Best call back number: [phone]  What is the best time to reach you: ANYTIME    Who are you requesting to speak with (clinical staff, provider,  specific staff member): NA    Do you know the name of the person who called: YESSY     What was the call regarding: PRIOR TE??

## 2024-01-23 ENCOUNTER — TELEPHONE (OUTPATIENT)
Dept: OBSTETRICS AND GYNECOLOGY | Facility: CLINIC | Age: 29
End: 2024-01-23
Payer: MEDICAID

## 2024-01-23 NOTE — TELEPHONE ENCOUNTER
Returned patient's call. Reviewed pap result of ASCUS with HPV pool positive and Dr. Heath's recommendation for colposcopy. Patient v/u; states she has had same results and a colposcopy in the past. Appointment scheduled.

## 2024-02-06 ENCOUNTER — OFFICE VISIT (OUTPATIENT)
Dept: INTERNAL MEDICINE | Facility: CLINIC | Age: 29
End: 2024-02-06
Payer: MEDICAID

## 2024-02-06 ENCOUNTER — TELEPHONE (OUTPATIENT)
Dept: OBSTETRICS AND GYNECOLOGY | Facility: CLINIC | Age: 29
End: 2024-02-06
Payer: MEDICAID

## 2024-02-06 ENCOUNTER — LAB (OUTPATIENT)
Dept: INTERNAL MEDICINE | Facility: CLINIC | Age: 29
End: 2024-02-06
Payer: MEDICAID

## 2024-02-06 VITALS
HEART RATE: 96 BPM | BODY MASS INDEX: 31.82 KG/M2 | DIASTOLIC BLOOD PRESSURE: 76 MMHG | HEIGHT: 64 IN | WEIGHT: 186.4 LBS | OXYGEN SATURATION: 98 % | TEMPERATURE: 97.1 F | SYSTOLIC BLOOD PRESSURE: 120 MMHG

## 2024-02-06 DIAGNOSIS — E61.1 IRON DEFICIENCY: ICD-10-CM

## 2024-02-06 DIAGNOSIS — E66.09 CLASS 1 OBESITY DUE TO EXCESS CALORIES WITHOUT SERIOUS COMORBIDITY IN ADULT, UNSPECIFIED BMI: Primary | ICD-10-CM

## 2024-02-06 DIAGNOSIS — R74.8 ELEVATED ALKALINE PHOSPHATASE LEVEL: ICD-10-CM

## 2024-02-06 DIAGNOSIS — E78.5 HYPERLIPIDEMIA, UNSPECIFIED HYPERLIPIDEMIA TYPE: ICD-10-CM

## 2024-02-06 LAB
DEPRECATED RDW RBC AUTO: 44 FL (ref 37–54)
ERYTHROCYTE [DISTWIDTH] IN BLOOD BY AUTOMATED COUNT: 13.2 % (ref 12.3–15.4)
HCT VFR BLD AUTO: 42.1 % (ref 34–46.6)
HGB BLD-MCNC: 13.7 G/DL (ref 12–15.9)
MCH RBC QN AUTO: 29.6 PG (ref 26.6–33)
MCHC RBC AUTO-ENTMCNC: 32.5 G/DL (ref 31.5–35.7)
MCV RBC AUTO: 90.9 FL (ref 79–97)
PLATELET # BLD AUTO: 256 10*3/MM3 (ref 140–450)
PMV BLD AUTO: 10.8 FL (ref 6–12)
RBC # BLD AUTO: 4.63 10*6/MM3 (ref 3.77–5.28)
WBC NRBC COR # BLD AUTO: 8.1 10*3/MM3 (ref 3.4–10.8)

## 2024-02-06 PROCEDURE — 36415 COLL VENOUS BLD VENIPUNCTURE: CPT | Performed by: PHYSICIAN ASSISTANT

## 2024-02-06 PROCEDURE — 1159F MED LIST DOCD IN RCRD: CPT | Performed by: PHYSICIAN ASSISTANT

## 2024-02-06 PROCEDURE — 84466 ASSAY OF TRANSFERRIN: CPT | Performed by: PHYSICIAN ASSISTANT

## 2024-02-06 PROCEDURE — 85027 COMPLETE CBC AUTOMATED: CPT | Performed by: PHYSICIAN ASSISTANT

## 2024-02-06 PROCEDURE — 82977 ASSAY OF GGT: CPT | Performed by: PHYSICIAN ASSISTANT

## 2024-02-06 PROCEDURE — 80053 COMPREHEN METABOLIC PANEL: CPT | Performed by: PHYSICIAN ASSISTANT

## 2024-02-06 PROCEDURE — 80061 LIPID PANEL: CPT | Performed by: PHYSICIAN ASSISTANT

## 2024-02-06 PROCEDURE — 83540 ASSAY OF IRON: CPT | Performed by: PHYSICIAN ASSISTANT

## 2024-02-06 PROCEDURE — 99214 OFFICE O/P EST MOD 30 MIN: CPT | Performed by: PHYSICIAN ASSISTANT

## 2024-02-06 PROCEDURE — 1160F RVW MEDS BY RX/DR IN RCRD: CPT | Performed by: PHYSICIAN ASSISTANT

## 2024-02-06 RX ORDER — BUPROPION HYDROCHLORIDE 150 MG/1
150 TABLET ORAL DAILY
Qty: 30 TABLET | Refills: 1 | Status: SHIPPED | OUTPATIENT
Start: 2024-02-06

## 2024-02-06 NOTE — TELEPHONE ENCOUNTER
Patient is currently BF and asking if ok to take wellbutrin. PCP is wanting to prescribe.     Instructed safe to take while BF but would also discuss with peds as well. She madiha.

## 2024-02-06 NOTE — PROGRESS NOTES
[facility]  [address]  [facility] [address]  Dept: [phone]  Dept Fax: [phone]  Loc: [phone]  Loc Fax: [phone]    Marie Overton  1995    Follow Up Office Visit Note    History of Present Illness:  Patient 28-year-old female in today to talk about obesity and follow-up for hyperlipidemia, elevated alk phos level, and iron deficiency.  Patient taking iron sulfate as directed with any problems or side effects.  Needing iron level rechecked.    Previous alk phos elevation needing to be reevaluated.  Patient needing repeat CMP and GGT.    Patient has been watching her diet cutting back on red meats and butter as well as eggs.  Needing FLP rechecked.    Patient would like to lose weight.  Was looking for semaglutide or oral medication.        The following portions of the patient's history were reviewed and updated as appropriate: allergies, current medications, past family history, past medical history, past social history, past surgical history, and problem list.    Medications:    Current Outpatient Medications:     buprenorphine (SUBUTEX) 8 MG sublingual tablet SL tablet, Daily., Disp: , Rfl:     ferrous sulfate 325 (65 FE) MG tablet, Take 1 tablet by mouth Daily With Breakfast., Disp: , Rfl:     Prenatal Vit-Fe Fumarate-FA (prenatal vitamin 28-0.8) 28-0.8 MG tablet tablet, Take 1 tablet by mouth Daily., Disp: 90 tablet, Rfl: 3    Current Facility-Administered Medications:     Levonorgestrel (MIRENA) 20 MCG/DAY IUD intrauterine device 1 each, 1 each, Intrauterine, Once, Tamiko Heath MD    Subjective  No Known Allergies     Past Medical History:   Diagnosis Date    ADHD (attention deficit hyperactivity disorder)     Depression     Herpes     History of substance abuse     sober for 6 months    HPV (human papilloma virus) infection     Preeclampsia in postpartum period        Past Surgical History:   Procedure Laterality Date     APPENDECTOMY         Family History   Problem Relation Age of Onset    Depression Sister     Bipolar disorder Maternal Uncle     Breast cancer Neg Hx     Ovarian cancer Neg Hx     Uterine cancer Neg Hx     Colon cancer Neg Hx         Social History     Socioeconomic History    Marital status: Single   Tobacco Use    Smoking status: Former     Types: Electronic Cigarette     Quit date: 3/13/2023     Years since quittin.9    Smokeless tobacco: Never    Tobacco comments:     Vape   Vaping Use    Vaping Use: Former    Substances: Nicotine, Flavoring    Devices: Disposable   Substance and Sexual Activity    Alcohol use: Not Currently    Drug use: Not Currently     Frequency: 7.0 times per week     Types: Fentanyl     Comment: sober for 1.5 years, on suboxone    Sexual activity: Yes     Partners: Male     Birth control/protection: None       Review of Systems   Constitutional:  Negative for activity change, chills, fatigue, fever and unexpected weight change.   HENT:  Negative for congestion, ear pain, postnasal drip, sinus pressure and sore throat.    Eyes:  Negative for pain, discharge and redness.   Respiratory:  Negative for cough, shortness of breath and wheezing.    Cardiovascular:  Negative for chest pain, palpitations and leg swelling.   Gastrointestinal:  Negative for diarrhea, nausea and vomiting.   Endocrine: Negative for cold intolerance and heat intolerance.   Genitourinary:  Negative for decreased urine volume and dysuria.   Musculoskeletal:  Negative for arthralgias and myalgias.   Skin:  Negative for rash and wound.   Neurological:  Negative for dizziness, light-headedness and headaches.   Hematological:  Does not bruise/bleed easily.   Psychiatric/Behavioral:  Negative for confusion, dysphoric mood and sleep disturbance. The patient is not nervous/anxious.          Objective  Vitals:    24 0909   BP: 120/76   BP Location: Left arm   Patient Position: Sitting   Cuff Size: Adult   Pulse: 96  "  Temp: 97.1 °F (36.2 °C)   TempSrc: Temporal   SpO2: 98%   Weight: 84.6 kg (186 lb 6.4 oz)   Height: 162.6 cm (64.02\")     Body mass index is 31.98 kg/m².     Physical Exam  Physical Exam  Vitals and nursing note reviewed.   Constitutional:       General: She is not in acute distress.     Appearance: She is obese. She is not ill-appearing.   HENT:      Head: Normocephalic.      Right Ear: Tympanic membrane, ear canal and external ear normal. There is no impacted cerumen.      Left Ear: Tympanic membrane, ear canal and external ear normal. There is no impacted cerumen.      Nose: No congestion or rhinorrhea.      Mouth/Throat:      Mouth: Mucous membranes are moist.      Pharynx: Oropharynx is clear. No oropharyngeal exudate or posterior oropharyngeal erythema.   Eyes:      General:         Right eye: No discharge.         Left eye: No discharge.      Extraocular Movements: Extraocular movements intact.      Conjunctiva/sclera: Conjunctivae normal.      Pupils: Pupils are equal, round, and reactive to light.   Cardiovascular:      Rate and Rhythm: Normal rate and regular rhythm.      Heart sounds: Normal heart sounds. No murmur heard.     No friction rub. No gallop.   Pulmonary:      Effort: Pulmonary effort is normal. No respiratory distress.      Breath sounds: Normal breath sounds. No wheezing.   Abdominal:      General: Bowel sounds are normal. There is no distension.      Palpations: Abdomen is soft. There is no mass.      Tenderness: There is no abdominal tenderness.   Musculoskeletal:         General: No swelling. Normal range of motion.      Cervical back: Normal range of motion. No tenderness.      Right lower leg: No edema.      Left lower leg: No edema.   Lymphadenopathy:      Cervical: No cervical adenopathy.   Skin:     Findings: No bruising, erythema or rash.   Neurological:      Mental Status: She is oriented to person, place, and time.      Gait: Gait normal.   Psychiatric:         Mood and Affect: " Mood normal.         Behavior: Behavior normal.         Thought Content: Thought content normal.         Judgment: Judgment normal.         Diagnostic Data  Procedures    Assessment  Diagnoses and all orders for this visit:    1. Class 1 obesity due to excess calories without serious comorbidity in adult, unspecified BMI (Primary)    2. Iron deficiency  -     Iron and TIBC; Future  -     CBC (No Diff)    3. Elevated alkaline phosphatase level  -     Comprehensive Metabolic Panel  -     Gamma GT; Future    4. Hyperlipidemia, unspecified hyperlipidemia type  -     Lipid Panel        Plan    1. Class 1 obesity due to excess calories without serious comorbidity in adult, unspecified BMI (Primary)- discussed on nutrition and exercise and follow-up with this.  Advised against taking Wellbutrin currently because she is breast-feeding.  Consult with OB/GYN and pediatrics to follow-up.  If okay with this reconsider starting Wellbutrin.    2. Iron deficiency- repeat iron levels and CBC.    3. Elevated alkaline phosphatase level- repeat CMP and obtain GGT.    4. Hyperlipidemia, unspecified hyperlipidemia type- continue cutting back high cholesterol foods and high fat foods.  Repeat FLP.      Return in about 3 months (around 5/6/2024) for Recheck.    Joseph Vargas PA-C  02/06/2024

## 2024-02-07 LAB
ALBUMIN SERPL-MCNC: 4.5 G/DL (ref 3.5–5.2)
ALBUMIN/GLOB SERPL: 1.5 G/DL
ALP SERPL-CCNC: 132 U/L (ref 39–117)
ALT SERPL W P-5'-P-CCNC: 16 U/L (ref 1–33)
ANION GAP SERPL CALCULATED.3IONS-SCNC: 11 MMOL/L (ref 5–15)
AST SERPL-CCNC: 20 U/L (ref 1–32)
BILIRUB SERPL-MCNC: 0.3 MG/DL (ref 0–1.2)
BUN SERPL-MCNC: 15 MG/DL (ref 6–20)
BUN/CREAT SERPL: 16.5 (ref 7–25)
CALCIUM SPEC-SCNC: 9.8 MG/DL (ref 8.6–10.5)
CHLORIDE SERPL-SCNC: 105 MMOL/L (ref 98–107)
CHOLEST SERPL-MCNC: 178 MG/DL (ref 0–200)
CO2 SERPL-SCNC: 24 MMOL/L (ref 22–29)
CREAT SERPL-MCNC: 0.91 MG/DL (ref 0.57–1)
EGFRCR SERPLBLD CKD-EPI 2021: 89.4 ML/MIN/1.73
GGT SERPL-CCNC: 24 U/L (ref 5–36)
GLOBULIN UR ELPH-MCNC: 3 GM/DL
GLUCOSE SERPL-MCNC: 73 MG/DL (ref 65–99)
HDLC SERPL-MCNC: 51 MG/DL (ref 40–60)
IRON 24H UR-MRATE: 85 MCG/DL (ref 37–145)
IRON SATN MFR SERPL: 25 % (ref 20–50)
LDLC SERPL CALC-MCNC: 116 MG/DL (ref 0–100)
LDLC/HDLC SERPL: 2.27 {RATIO}
POTASSIUM SERPL-SCNC: 4.1 MMOL/L (ref 3.5–5.2)
PROT SERPL-MCNC: 7.5 G/DL (ref 6–8.5)
SODIUM SERPL-SCNC: 140 MMOL/L (ref 136–145)
TIBC SERPL-MCNC: 340 MCG/DL (ref 298–536)
TRANSFERRIN SERPL-MCNC: 228 MG/DL (ref 200–360)
TRIGL SERPL-MCNC: 55 MG/DL (ref 0–150)
VLDLC SERPL-MCNC: 11 MG/DL (ref 5–40)

## 2024-02-15 ENCOUNTER — OFFICE VISIT (OUTPATIENT)
Dept: OBSTETRICS AND GYNECOLOGY | Facility: CLINIC | Age: 29
End: 2024-02-15
Payer: MEDICAID

## 2024-02-15 VITALS
BODY MASS INDEX: 31.65 KG/M2 | HEIGHT: 64 IN | DIASTOLIC BLOOD PRESSURE: 90 MMHG | WEIGHT: 185.4 LBS | SYSTOLIC BLOOD PRESSURE: 138 MMHG

## 2024-02-15 DIAGNOSIS — Z76.89 ENCOUNTER FOR BIOPSY: Primary | ICD-10-CM

## 2024-02-15 RX ORDER — BUPRENORPHINE HYDROCHLORIDE AND NALOXONE HYDROCHLORIDE DIHYDRATE 8; 2 MG/1; MG/1
TABLET SUBLINGUAL
COMMUNITY
Start: 2024-02-01

## 2024-02-16 LAB — REF LAB TEST METHOD: NORMAL

## 2024-02-29 ENCOUNTER — TELEPHONE (OUTPATIENT)
Dept: INTERNAL MEDICINE | Facility: CLINIC | Age: 29
End: 2024-02-29
Payer: MEDICAID

## 2024-02-29 RX ORDER — BUPROPION HYDROCHLORIDE 150 MG/1
150 TABLET ORAL DAILY
Qty: 90 TABLET | Refills: 1 | Status: SHIPPED | OUTPATIENT
Start: 2024-02-29

## 2024-04-09 ENCOUNTER — TELEPHONE (OUTPATIENT)
Dept: INTERNAL MEDICINE | Facility: CLINIC | Age: 29
End: 2024-04-09

## 2024-04-09 ENCOUNTER — TELEPHONE (OUTPATIENT)
Dept: OBSTETRICS AND GYNECOLOGY | Facility: CLINIC | Age: 29
End: 2024-04-09
Payer: MEDICAID

## 2024-04-09 NOTE — TELEPHONE ENCOUNTER
Patient informed recommendations suggest weighing risk vs benefits while breastfeeding. Not enough human data available but possible risk for infant seizures. No human data available to assess effects on milk production. Patient reports no problems with baby while taking  150 mg XL. Advised to use the lowest does that is effective for symptoms and continue to monitor baby. Seek medical attention for any questions or concerns.

## 2024-04-09 NOTE — TELEPHONE ENCOUNTER
Provider: DR. MORALES    Caller: JOHANN SHAFER    Relationship to Patient: SELF    Pharmacy: [facility] @ [address]    Phone Number: [phone]    Reason for Call: PT ADV SHE IS BREASTFEEDING AND HAS ASKED HER PSYCHIATRIST TO INCREASE HER WELLBUTRIN. SHE IS CURRENTLY TAKING 150MGS AND DOESN'T KNOW WHAT THEY WILL INCREASE IT TO. WANTS TO MAKE SURE THAT IS SAFE.    When was the patient last seen: 02-15-24

## 2024-04-10 RX ORDER — BUPROPION HYDROCHLORIDE 300 MG/1
300 TABLET ORAL DAILY
Qty: 90 TABLET | Refills: 1 | Status: SHIPPED | OUTPATIENT
Start: 2024-04-10

## 2024-04-10 RX ORDER — PNV NO.95/FERROUS FUM/FOLIC AC 28MG-0.8MG
1 TABLET ORAL DAILY
Qty: 90 TABLET | Refills: 3 | Status: SHIPPED | OUTPATIENT
Start: 2024-04-10

## 2024-05-07 ENCOUNTER — LAB (OUTPATIENT)
Dept: INTERNAL MEDICINE | Facility: CLINIC | Age: 29
End: 2024-05-07
Payer: MEDICAID

## 2024-05-07 ENCOUNTER — OFFICE VISIT (OUTPATIENT)
Dept: INTERNAL MEDICINE | Facility: CLINIC | Age: 29
End: 2024-05-07
Payer: MEDICAID

## 2024-05-07 VITALS
TEMPERATURE: 97.1 F | SYSTOLIC BLOOD PRESSURE: 110 MMHG | HEART RATE: 82 BPM | OXYGEN SATURATION: 96 % | WEIGHT: 175.8 LBS | DIASTOLIC BLOOD PRESSURE: 78 MMHG | BODY MASS INDEX: 30.01 KG/M2 | HEIGHT: 64 IN

## 2024-05-07 DIAGNOSIS — E78.5 HYPERLIPIDEMIA, UNSPECIFIED HYPERLIPIDEMIA TYPE: ICD-10-CM

## 2024-05-07 DIAGNOSIS — R41.840 ATTENTION DEFICIT: Primary | ICD-10-CM

## 2024-05-07 DIAGNOSIS — F19.11 HISTORY OF SUBSTANCE ABUSE: ICD-10-CM

## 2024-05-07 DIAGNOSIS — Z00.00 HEALTH CARE MAINTENANCE: ICD-10-CM

## 2024-05-07 DIAGNOSIS — Z78.9 USES BIRTH CONTROL: ICD-10-CM

## 2024-05-07 LAB
DEPRECATED RDW RBC AUTO: 41.4 FL (ref 37–54)
ERYTHROCYTE [DISTWIDTH] IN BLOOD BY AUTOMATED COUNT: 12.7 % (ref 12.3–15.4)
HBA1C MFR BLD: 5.1 % (ref 4.8–5.6)
HCT VFR BLD AUTO: 40.4 % (ref 34–46.6)
HGB BLD-MCNC: 13.3 G/DL (ref 12–15.9)
MCH RBC QN AUTO: 29.8 PG (ref 26.6–33)
MCHC RBC AUTO-ENTMCNC: 32.9 G/DL (ref 31.5–35.7)
MCV RBC AUTO: 90.4 FL (ref 79–97)
PLATELET # BLD AUTO: 259 10*3/MM3 (ref 140–450)
PMV BLD AUTO: 11.1 FL (ref 6–12)
RBC # BLD AUTO: 4.47 10*6/MM3 (ref 3.77–5.28)
WBC NRBC COR # BLD AUTO: 7.45 10*3/MM3 (ref 3.4–10.8)

## 2024-05-07 PROCEDURE — 99214 OFFICE O/P EST MOD 30 MIN: CPT | Performed by: PHYSICIAN ASSISTANT

## 2024-05-07 PROCEDURE — 83036 HEMOGLOBIN GLYCOSYLATED A1C: CPT | Performed by: PHYSICIAN ASSISTANT

## 2024-05-07 PROCEDURE — 1126F AMNT PAIN NOTED NONE PRSNT: CPT | Performed by: PHYSICIAN ASSISTANT

## 2024-05-07 PROCEDURE — 36415 COLL VENOUS BLD VENIPUNCTURE: CPT | Performed by: PHYSICIAN ASSISTANT

## 2024-05-07 PROCEDURE — 80061 LIPID PANEL: CPT | Performed by: PHYSICIAN ASSISTANT

## 2024-05-07 PROCEDURE — 80050 GENERAL HEALTH PANEL: CPT | Performed by: PHYSICIAN ASSISTANT

## 2024-05-07 PROCEDURE — 1159F MED LIST DOCD IN RCRD: CPT | Performed by: PHYSICIAN ASSISTANT

## 2024-05-07 PROCEDURE — 1160F RVW MEDS BY RX/DR IN RCRD: CPT | Performed by: PHYSICIAN ASSISTANT

## 2024-05-08 LAB
ALBUMIN SERPL-MCNC: 4.2 G/DL (ref 3.5–5.2)
ALBUMIN/GLOB SERPL: 1.4 G/DL
ALP SERPL-CCNC: 116 U/L (ref 39–117)
ALT SERPL W P-5'-P-CCNC: 16 U/L (ref 1–33)
ANION GAP SERPL CALCULATED.3IONS-SCNC: 12 MMOL/L (ref 5–15)
AST SERPL-CCNC: 11 U/L (ref 1–32)
BILIRUB SERPL-MCNC: 0.4 MG/DL (ref 0–1.2)
BUN SERPL-MCNC: 16 MG/DL (ref 6–20)
BUN/CREAT SERPL: 19.8 (ref 7–25)
CALCIUM SPEC-SCNC: 9 MG/DL (ref 8.6–10.5)
CHLORIDE SERPL-SCNC: 105 MMOL/L (ref 98–107)
CHOLEST SERPL-MCNC: 177 MG/DL (ref 0–200)
CO2 SERPL-SCNC: 22 MMOL/L (ref 22–29)
CREAT SERPL-MCNC: 0.81 MG/DL (ref 0.57–1)
EGFRCR SERPLBLD CKD-EPI 2021: 102.2 ML/MIN/1.73
GLOBULIN UR ELPH-MCNC: 3 GM/DL
GLUCOSE SERPL-MCNC: 66 MG/DL (ref 65–99)
HDLC SERPL-MCNC: 53 MG/DL (ref 40–60)
LDLC SERPL CALC-MCNC: 112 MG/DL (ref 0–100)
LDLC/HDLC SERPL: 2.11 {RATIO}
POTASSIUM SERPL-SCNC: 4.2 MMOL/L (ref 3.5–5.2)
PROT SERPL-MCNC: 7.2 G/DL (ref 6–8.5)
SODIUM SERPL-SCNC: 139 MMOL/L (ref 136–145)
TRIGL SERPL-MCNC: 60 MG/DL (ref 0–150)
TSH SERPL DL<=0.05 MIU/L-ACNC: 1.77 UIU/ML (ref 0.27–4.2)
VLDLC SERPL-MCNC: 12 MG/DL (ref 5–40)

## 2024-06-18 ENCOUNTER — OFFICE VISIT (OUTPATIENT)
Dept: INTERNAL MEDICINE | Facility: CLINIC | Age: 29
End: 2024-06-18
Payer: MEDICAID

## 2024-06-18 VITALS
WEIGHT: 176.2 LBS | DIASTOLIC BLOOD PRESSURE: 70 MMHG | HEIGHT: 64 IN | SYSTOLIC BLOOD PRESSURE: 106 MMHG | TEMPERATURE: 97.1 F | OXYGEN SATURATION: 98 % | HEART RATE: 90 BPM | BODY MASS INDEX: 30.08 KG/M2

## 2024-06-18 DIAGNOSIS — E66.09 CLASS 1 OBESITY DUE TO EXCESS CALORIES WITHOUT SERIOUS COMORBIDITY IN ADULT, UNSPECIFIED BMI: Primary | ICD-10-CM

## 2024-06-18 DIAGNOSIS — F41.8 MIXED ANXIETY AND DEPRESSIVE DISORDER: ICD-10-CM

## 2024-06-18 DIAGNOSIS — F19.11 HISTORY OF SUBSTANCE ABUSE: ICD-10-CM

## 2024-06-18 PROCEDURE — 99214 OFFICE O/P EST MOD 30 MIN: CPT | Performed by: PHYSICIAN ASSISTANT

## 2024-06-18 PROCEDURE — 1126F AMNT PAIN NOTED NONE PRSNT: CPT | Performed by: PHYSICIAN ASSISTANT

## 2024-06-18 PROCEDURE — 1160F RVW MEDS BY RX/DR IN RCRD: CPT | Performed by: PHYSICIAN ASSISTANT

## 2024-06-18 PROCEDURE — 1159F MED LIST DOCD IN RCRD: CPT | Performed by: PHYSICIAN ASSISTANT

## 2024-06-18 NOTE — PROGRESS NOTES
[facility]  [address]  [facility] [address]  Dept: [phone]  Dept Fax: [phone]  Loc: [phone]  Loc Fax: [phone]    Marie Overton  1995    Follow Up Office Visit Note    History of Present Illness:  Patient is a 29-year-old female in today for weight loss.  Would like to try medicine for this.        The following portions of the patient's history were reviewed and updated as appropriate: allergies, current medications, past family history, past medical history, past social history, past surgical history, and problem list.    Medications:    Current Outpatient Medications:     buprenorphine-naloxone (SUBOXONE) 8-2 MG per SL tablet, , Disp: , Rfl:     buPROPion XL (Wellbutrin XL) 300 MG 24 hr tablet, Take 1 tablet by mouth Daily., Disp: 90 tablet, Rfl: 1    Prenatal Vit-Fe Fumarate-FA (prenatal vitamin 28-0.8) 28-0.8 MG tablet tablet, Take 1 tablet by mouth Daily., Disp: 90 tablet, Rfl: 3    Tirzepatide-Weight Management (ZEPBOUND) 2.5 MG/0.5ML solution auto-injector, Inject 0.5 mL under the skin into the appropriate area as directed 1 (One) Time Per Week., Disp: 2 mL, Rfl: 1    Current Facility-Administered Medications:     Levonorgestrel (MIRENA) 20 MCG/DAY IUD intrauterine device 1 each, 1 each, Intrauterine, Once, Tamiko Heath MD    Subjective  No Known Allergies     Past Medical History:   Diagnosis Date    ADHD (attention deficit hyperactivity disorder)     Depression     Herpes     History of substance abuse     sober for 6 months    HPV (human papilloma virus) infection     Preeclampsia in postpartum period        Past Surgical History:   Procedure Laterality Date    APPENDECTOMY         Family History   Problem Relation Age of Onset    Depression Sister     Bipolar disorder Maternal Uncle     Breast cancer Neg Hx     Ovarian cancer Neg Hx     Uterine cancer Neg Hx     Colon cancer Neg Hx         Social History  "    Socioeconomic History    Marital status: Single   Tobacco Use    Smoking status: Former     Types: Electronic Cigarette     Quit date: 3/13/2023     Years since quittin.2    Smokeless tobacco: Never    Tobacco comments:     Vape   Vaping Use    Vaping status: Former    Substances: Nicotine, Flavoring    Devices: Disposable   Substance and Sexual Activity    Alcohol use: Not Currently    Drug use: Not Currently     Frequency: 7.0 times per week     Types: Fentanyl     Comment: sober for 1.5 years, on suboxone    Sexual activity: Yes     Partners: Male     Birth control/protection: None       Review of Systems   Constitutional:  Negative for activity change, chills, fatigue, fever and unexpected weight change.   HENT:  Negative for congestion, ear pain, postnasal drip, sinus pressure and sore throat.    Eyes:  Negative for pain, discharge and redness.   Respiratory:  Negative for cough, shortness of breath and wheezing.    Cardiovascular:  Negative for chest pain, palpitations and leg swelling.   Gastrointestinal:  Negative for diarrhea, nausea and vomiting.   Endocrine: Negative for cold intolerance and heat intolerance.   Genitourinary:  Negative for decreased urine volume and dysuria.   Musculoskeletal:  Negative for arthralgias and myalgias.   Skin:  Negative for rash and wound.   Neurological:  Negative for dizziness, light-headedness and headaches.   Hematological:  Does not bruise/bleed easily.   Psychiatric/Behavioral:  Negative for confusion, dysphoric mood and sleep disturbance. The patient is not nervous/anxious.          Objective  Vitals:    24 1022   BP: 106/70   BP Location: Left arm   Patient Position: Sitting   Cuff Size: Large Adult   Pulse: 90   Temp: 97.1 °F (36.2 °C)   TempSrc: Temporal   SpO2: 98%   Weight: 79.9 kg (176 lb 3.2 oz)   Height: 162.6 cm (64.02\")     Body mass index is 30.23 kg/m².     Physical Exam  Physical Exam  Vitals and nursing note reviewed.   Constitutional:      "  General: She is not in acute distress.     Appearance: She is obese. She is not ill-appearing.   HENT:      Head: Normocephalic.      Right Ear: Tympanic membrane, ear canal and external ear normal. There is no impacted cerumen.      Left Ear: Tympanic membrane, ear canal and external ear normal. There is no impacted cerumen.      Nose: No congestion or rhinorrhea.      Mouth/Throat:      Mouth: Mucous membranes are moist.      Pharynx: Oropharynx is clear. No oropharyngeal exudate or posterior oropharyngeal erythema.   Eyes:      General:         Right eye: No discharge.         Left eye: No discharge.      Extraocular Movements: Extraocular movements intact.      Conjunctiva/sclera: Conjunctivae normal.      Pupils: Pupils are equal, round, and reactive to light.   Cardiovascular:      Rate and Rhythm: Normal rate and regular rhythm.      Heart sounds: Normal heart sounds. No murmur heard.     No friction rub. No gallop.   Pulmonary:      Effort: Pulmonary effort is normal. No respiratory distress.      Breath sounds: Normal breath sounds. No wheezing.   Abdominal:      General: Bowel sounds are normal. There is no distension.      Palpations: Abdomen is soft. There is no mass.      Tenderness: There is no abdominal tenderness.   Musculoskeletal:         General: No swelling. Normal range of motion.      Cervical back: Normal range of motion. No tenderness.      Right lower leg: No edema.      Left lower leg: No edema.   Lymphadenopathy:      Cervical: No cervical adenopathy.   Skin:     Findings: No bruising, erythema or rash.   Neurological:      Mental Status: She is oriented to person, place, and time.      Gait: Gait normal.   Psychiatric:         Mood and Affect: Mood normal.         Behavior: Behavior normal.         Thought Content: Thought content normal.         Judgment: Judgment normal.         Diagnostic Data  Procedures    Assessment  Diagnoses and all orders for this visit:    1. Class 1 obesity  due to excess calories without serious comorbidity in adult, unspecified BMI (Primary)    2. Mixed anxiety and depressive disorder    3. History of substance abuse    Other orders  -     Tirzepatide-Weight Management (ZEPBOUND) 2.5 MG/0.5ML solution auto-injector; Inject 0.5 mL under the skin into the appropriate area as directed 1 (One) Time Per Week.  Dispense: 2 mL; Refill: 1        Plan    1. Class 1 obesity due to excess calories without serious comorbidity in adult, unspecified BMI (Primary)- trial of Zepbound.    2. Mixed anxiety and depressive disorder- on Wellbutrin.    3. History of substance abuse- on Suboxone.      Return in about 4 weeks (around 7/16/2024) for Recheck.    Joseph Vargas PA-C  06/18/2024

## 2024-06-21 ENCOUNTER — TELEPHONE (OUTPATIENT)
Dept: INTERNAL MEDICINE | Facility: CLINIC | Age: 29
End: 2024-06-21

## 2024-06-21 NOTE — TELEPHONE ENCOUNTER
Caller: Marie Overton    Relationship: Self    Best call back number: [phone]     Which medication are you concerned about:     Tirzepatide-Weight Management (ZEPBOUND) 2.5 MG/0.5ML solution auto-injector       Who prescribed you this medication: RADHA        What are your concerns: PATIENT STATED THE PHARMACY WAS TO HAVE SENT A FAX TO THE OFFICE ADVISING THE ABOVE MEDICATION IS REQUIRING A PA. HAS THAT BEEN INITIATED? CALL PATIENT WITH STATUS

## 2024-06-21 NOTE — TELEPHONE ENCOUNTER
This drug/product is not covered under the pharmacy benefit. Prior Authorization is not available.

## 2024-07-16 ENCOUNTER — PATIENT MESSAGE (OUTPATIENT)
Dept: OBSTETRICS AND GYNECOLOGY | Facility: CLINIC | Age: 29
End: 2024-07-16
Payer: MEDICAID

## 2024-07-22 RX ORDER — VALACYCLOVIR HYDROCHLORIDE 500 MG/1
500 TABLET, FILM COATED ORAL 2 TIMES DAILY
Qty: 6 TABLET | Refills: 0 | Status: SHIPPED | OUTPATIENT
Start: 2024-07-22 | End: 2024-07-25

## 2024-07-22 NOTE — TELEPHONE ENCOUNTER
From: Marie Overton  To: Tamiko Heath  Sent: 7/16/2024 10:19 PM EDT  Subject: Valtrex/Valacyclovir refill    Hi Dr. Heath  I was wondering if you could refill Valacyclovir for me?     Thank you,   Marie Overton

## 2024-08-13 ENCOUNTER — OFFICE VISIT (OUTPATIENT)
Dept: INTERNAL MEDICINE | Facility: CLINIC | Age: 29
End: 2024-08-13
Payer: MEDICAID

## 2024-08-13 VITALS
HEART RATE: 78 BPM | TEMPERATURE: 96.9 F | DIASTOLIC BLOOD PRESSURE: 74 MMHG | OXYGEN SATURATION: 98 % | WEIGHT: 170 LBS | SYSTOLIC BLOOD PRESSURE: 114 MMHG | HEIGHT: 64 IN | BODY MASS INDEX: 29.02 KG/M2

## 2024-08-13 DIAGNOSIS — E66.09 CLASS 1 OBESITY DUE TO EXCESS CALORIES WITHOUT SERIOUS COMORBIDITY IN ADULT, UNSPECIFIED BMI: Primary | ICD-10-CM

## 2024-08-13 DIAGNOSIS — F41.8 MIXED ANXIETY AND DEPRESSIVE DISORDER: ICD-10-CM

## 2024-08-13 DIAGNOSIS — E78.5 HYPERLIPIDEMIA, UNSPECIFIED HYPERLIPIDEMIA TYPE: ICD-10-CM

## 2024-08-13 DIAGNOSIS — Z00.00 HEALTH CARE MAINTENANCE: ICD-10-CM

## 2024-08-13 DIAGNOSIS — F19.11 HISTORY OF SUBSTANCE ABUSE: ICD-10-CM

## 2024-08-13 LAB
ALBUMIN SERPL-MCNC: 4.4 G/DL (ref 3.5–5.2)
ALBUMIN/GLOB SERPL: 1.5 G/DL
ALP SERPL-CCNC: 132 U/L (ref 39–117)
ALT SERPL W P-5'-P-CCNC: 25 U/L (ref 1–33)
ANION GAP SERPL CALCULATED.3IONS-SCNC: 7.9 MMOL/L (ref 5–15)
AST SERPL-CCNC: 19 U/L (ref 1–32)
BILIRUB SERPL-MCNC: 0.3 MG/DL (ref 0–1.2)
BUN SERPL-MCNC: 16 MG/DL (ref 6–20)
BUN/CREAT SERPL: 15 (ref 7–25)
CALCIUM SPEC-SCNC: 9.2 MG/DL (ref 8.6–10.5)
CHLORIDE SERPL-SCNC: 105 MMOL/L (ref 98–107)
CHOLEST SERPL-MCNC: 202 MG/DL (ref 0–200)
CO2 SERPL-SCNC: 25.1 MMOL/L (ref 22–29)
CREAT SERPL-MCNC: 1.07 MG/DL (ref 0.57–1)
DEPRECATED RDW RBC AUTO: 42 FL (ref 37–54)
EGFRCR SERPLBLD CKD-EPI 2021: 73.2 ML/MIN/1.73
ERYTHROCYTE [DISTWIDTH] IN BLOOD BY AUTOMATED COUNT: 12.6 % (ref 12.3–15.4)
GLOBULIN UR ELPH-MCNC: 3 GM/DL
GLUCOSE SERPL-MCNC: 99 MG/DL (ref 65–99)
HBA1C MFR BLD: 5.4 % (ref 4.8–5.6)
HCT VFR BLD AUTO: 42.7 % (ref 34–46.6)
HDLC SERPL-MCNC: 55 MG/DL (ref 40–60)
HGB BLD-MCNC: 14 G/DL (ref 12–15.9)
LDLC SERPL CALC-MCNC: 135 MG/DL (ref 0–100)
LDLC/HDLC SERPL: 2.44 {RATIO}
MCH RBC QN AUTO: 30.2 PG (ref 26.6–33)
MCHC RBC AUTO-ENTMCNC: 32.8 G/DL (ref 31.5–35.7)
MCV RBC AUTO: 92 FL (ref 79–97)
PLATELET # BLD AUTO: 230 10*3/MM3 (ref 140–450)
PMV BLD AUTO: 11.1 FL (ref 6–12)
POTASSIUM SERPL-SCNC: 4 MMOL/L (ref 3.5–5.2)
PROT SERPL-MCNC: 7.4 G/DL (ref 6–8.5)
RBC # BLD AUTO: 4.64 10*6/MM3 (ref 3.77–5.28)
SODIUM SERPL-SCNC: 138 MMOL/L (ref 136–145)
TRIGL SERPL-MCNC: 65 MG/DL (ref 0–150)
TSH SERPL DL<=0.05 MIU/L-ACNC: 3.3 UIU/ML (ref 0.27–4.2)
VLDLC SERPL-MCNC: 12 MG/DL (ref 5–40)
WBC NRBC COR # BLD AUTO: 5.82 10*3/MM3 (ref 3.4–10.8)

## 2024-08-13 PROCEDURE — 1126F AMNT PAIN NOTED NONE PRSNT: CPT | Performed by: PHYSICIAN ASSISTANT

## 2024-08-13 PROCEDURE — 99214 OFFICE O/P EST MOD 30 MIN: CPT | Performed by: PHYSICIAN ASSISTANT

## 2024-08-13 PROCEDURE — 1160F RVW MEDS BY RX/DR IN RCRD: CPT | Performed by: PHYSICIAN ASSISTANT

## 2024-08-13 PROCEDURE — 1159F MED LIST DOCD IN RCRD: CPT | Performed by: PHYSICIAN ASSISTANT

## 2024-08-13 PROCEDURE — 80050 GENERAL HEALTH PANEL: CPT | Performed by: PHYSICIAN ASSISTANT

## 2024-08-13 PROCEDURE — 80061 LIPID PANEL: CPT | Performed by: PHYSICIAN ASSISTANT

## 2024-08-13 PROCEDURE — 83036 HEMOGLOBIN GLYCOSYLATED A1C: CPT | Performed by: PHYSICIAN ASSISTANT

## 2024-08-13 NOTE — PROGRESS NOTES
[facility]  [address]  [facility] [address]  Dept: [phone]  Dept Fax: [phone]  Loc: [phone]  Loc Fax: [phone]    Marie Overton  1995    Follow Up Office Visit Note    History of Present Illness:  Patient is a 29-year-old female in today to follow-up for weight loss.  Recently saw an online provider for semaglutide prescription due to Zepbound being $1200.  Still on Zepbound currently but getting ready to transition to semaglutide generic.    Headache      The following portions of the patient's history were reviewed and updated as appropriate: allergies, current medications, past family history, past medical history, past social history, past surgical history, and problem list.    Medications:    Current Outpatient Medications:     buprenorphine-naloxone (SUBOXONE) 8-2 MG per SL tablet, , Disp: , Rfl:     buPROPion XL (Wellbutrin XL) 300 MG 24 hr tablet, Take 1 tablet by mouth Daily., Disp: 90 tablet, Rfl: 1    Prenatal Vit-Fe Fumarate-FA (prenatal vitamin 28-0.8) 28-0.8 MG tablet tablet, Take 1 tablet by mouth Daily., Disp: 90 tablet, Rfl: 3    Semaglutide,0.25 or 0.5MG/DOS, (OZEMPIC) 2 MG/1.5ML solution pen-injector, Inject 0.25 mg under the skin into the appropriate area as directed 1 (One) Time Per Week., Disp: , Rfl:     Current Facility-Administered Medications:     Levonorgestrel (MIRENA) 20 MCG/DAY IUD intrauterine device 1 each, 1 each, Intrauterine, Once, Tamiko Heath MD    Subjective  No Known Allergies     Past Medical History:   Diagnosis Date    ADHD (attention deficit hyperactivity disorder)     Depression     Herpes     History of substance abuse     sober for 6 months    HPV (human papilloma virus) infection     Preeclampsia in postpartum period        Past Surgical History:   Procedure Laterality Date    APPENDECTOMY         Family History   Problem Relation Age of Onset    Depression Sister     Bipolar  disorder Maternal Uncle     Breast cancer Neg Hx     Ovarian cancer Neg Hx     Uterine cancer Neg Hx     Colon cancer Neg Hx         Social History     Socioeconomic History    Marital status: Single   Tobacco Use    Smoking status: Former     Types: Electronic Cigarette     Quit date: 3/13/2023     Years since quittin.4    Smokeless tobacco: Never    Tobacco comments:     Vape   Vaping Use    Vaping status: Former    Substances: Nicotine, Flavoring    Devices: Disposable   Substance and Sexual Activity    Alcohol use: Not Currently    Drug use: Not Currently     Frequency: 7.0 times per week     Types: Fentanyl     Comment: sober for 1.5 years, on suboxone    Sexual activity: Yes     Partners: Male     Birth control/protection: None       Review of Systems   Constitutional:  Negative for activity change, chills, fatigue, fever and unexpected weight change.   HENT:  Negative for congestion, ear pain, postnasal drip, sinus pressure and sore throat.    Eyes:  Negative for pain, discharge and redness.   Respiratory:  Negative for cough, shortness of breath and wheezing.    Cardiovascular:  Negative for chest pain, palpitations and leg swelling.   Gastrointestinal:  Negative for diarrhea, nausea and vomiting.   Endocrine: Negative for cold intolerance and heat intolerance.   Genitourinary:  Negative for decreased urine volume and dysuria.   Musculoskeletal:  Negative for arthralgias and myalgias.   Skin:  Negative for rash and wound.   Neurological:  Positive for headaches (From clenching her jaw and grinding her teeth). Negative for dizziness and light-headedness.   Hematological:  Does not bruise/bleed easily.   Psychiatric/Behavioral:  Negative for confusion, dysphoric mood and sleep disturbance. The patient is not nervous/anxious.          Objective  Vitals:    24 0958   BP: 114/74   BP Location: Left arm   Patient Position: Sitting   Cuff Size: Large Adult   Pulse: 78   Temp: 96.9 °F (36.1 °C)   TempSrc:  "Temporal   SpO2: 98%   Weight: 77.1 kg (170 lb)   Height: 162.6 cm (64.02\")     Body mass index is 29.17 kg/m².     Physical Exam  Physical Exam  Vitals and nursing note reviewed.   Constitutional:       General: She is not in acute distress.     Appearance: She is not ill-appearing.   HENT:      Head: Normocephalic.      Right Ear: Tympanic membrane, ear canal and external ear normal. There is no impacted cerumen.      Left Ear: Tympanic membrane, ear canal and external ear normal. There is no impacted cerumen.      Nose: No congestion or rhinorrhea.      Mouth/Throat:      Mouth: Mucous membranes are moist.      Pharynx: Oropharynx is clear. No oropharyngeal exudate or posterior oropharyngeal erythema.   Eyes:      General:         Right eye: No discharge.         Left eye: No discharge.      Extraocular Movements: Extraocular movements intact.      Conjunctiva/sclera: Conjunctivae normal.      Pupils: Pupils are equal, round, and reactive to light.   Cardiovascular:      Rate and Rhythm: Normal rate and regular rhythm.      Heart sounds: Normal heart sounds. No murmur heard.     No friction rub. No gallop.   Pulmonary:      Effort: Pulmonary effort is normal. No respiratory distress.      Breath sounds: Normal breath sounds. No wheezing.   Abdominal:      General: Bowel sounds are normal. There is no distension.      Palpations: Abdomen is soft. There is no mass.      Tenderness: There is no abdominal tenderness.   Musculoskeletal:         General: No swelling. Normal range of motion.      Cervical back: Normal range of motion. No tenderness.      Right lower leg: No edema.      Left lower leg: No edema.   Lymphadenopathy:      Cervical: No cervical adenopathy.   Skin:     Findings: No bruising, erythema or rash.   Neurological:      Mental Status: She is oriented to person, place, and time.      Gait: Gait normal.   Psychiatric:         Mood and Affect: Mood normal.         Behavior: Behavior normal.         " Thought Content: Thought content normal.         Judgment: Judgment normal.         Diagnostic Data  Procedures    Assessment  Diagnoses and all orders for this visit:    1. Class 1 obesity due to excess calories without serious comorbidity in adult, unspecified BMI (Primary)    2. Mixed anxiety and depressive disorder    3. History of substance abuse    4. Hyperlipidemia, unspecified hyperlipidemia type    5. Health care maintenance  -     CBC (No Diff)  -     Comprehensive Metabolic Panel  -     TSH Rfx On Abnormal To Free T4  -     Hemoglobin A1c; Future  -     Lipid Panel  -     Hemoglobin A1c    Other orders  -     Semaglutide,0.25 or 0.5MG/DOS, (OZEMPIC) 2 MG/1.5ML solution pen-injector; Inject 0.25 mg under the skin into the appropriate area as directed 1 (One) Time Per Week.        Plan    1. Class 1 obesity due to excess calories without serious comorbidity in adult, unspecified BMI (Primary)- transitioning from tirzepatide to semaglutide.    2. Mixed anxiety and depressive disorder- well-controlled on Wellbutrin.  Keep same.  Continue to monitor.    3. History of substance abuse- stable on Suboxone.    4. Hyperlipidemia, unspecified hyperlipidemia type- repeat FLP.    5. Health care maintenance- ordered fasting labs.    Advised to follow-up with dentist for teeth grinding and jaw clenching.      Return in about 4 weeks (around 9/10/2024) for Recheck.    Joseph Vargas PA-C  08/13/2024

## 2024-08-15 DIAGNOSIS — R74.8 ELEVATED ALKALINE PHOSPHATASE LEVEL: Primary | ICD-10-CM

## 2024-09-16 ENCOUNTER — OFFICE VISIT (OUTPATIENT)
Dept: INTERNAL MEDICINE | Facility: CLINIC | Age: 29
End: 2024-09-16
Payer: MEDICAID

## 2024-09-16 VITALS
BODY MASS INDEX: 27.49 KG/M2 | SYSTOLIC BLOOD PRESSURE: 128 MMHG | OXYGEN SATURATION: 98 % | WEIGHT: 161 LBS | TEMPERATURE: 97.3 F | HEIGHT: 64 IN | DIASTOLIC BLOOD PRESSURE: 78 MMHG | HEART RATE: 89 BPM

## 2024-09-16 DIAGNOSIS — F19.11 HISTORY OF SUBSTANCE ABUSE: ICD-10-CM

## 2024-09-16 DIAGNOSIS — F41.8 MIXED ANXIETY AND DEPRESSIVE DISORDER: ICD-10-CM

## 2024-09-16 DIAGNOSIS — E66.09 CLASS 1 OBESITY DUE TO EXCESS CALORIES WITHOUT SERIOUS COMORBIDITY IN ADULT, UNSPECIFIED BMI: Primary | ICD-10-CM

## 2024-09-16 PROCEDURE — 1126F AMNT PAIN NOTED NONE PRSNT: CPT | Performed by: PHYSICIAN ASSISTANT

## 2024-09-16 PROCEDURE — 99214 OFFICE O/P EST MOD 30 MIN: CPT | Performed by: PHYSICIAN ASSISTANT

## 2024-09-16 PROCEDURE — 1160F RVW MEDS BY RX/DR IN RCRD: CPT | Performed by: PHYSICIAN ASSISTANT

## 2024-09-16 PROCEDURE — 1159F MED LIST DOCD IN RCRD: CPT | Performed by: PHYSICIAN ASSISTANT

## 2024-10-04 ENCOUNTER — TELEPHONE (OUTPATIENT)
Dept: INTERNAL MEDICINE | Facility: CLINIC | Age: 29
End: 2024-10-04
Payer: MEDICAID

## 2024-10-04 NOTE — TELEPHONE ENCOUNTER
Caller: Marie Overton    Relationship to patient: Self    Best call back number: [phone]    Chief complaint: NEEDS AN EKG    Type of visit: IN OFFICE PROCEDURE    Requested date:  TUESDAY 10/08, WED 10/09 OR FRIDAY 10/11 WOULD BE GOOD DAYS FOR OUR PATIENT    Additional notes:OUR PATIENT NEEDS AN EKG IN ORDER FOR HER PSYCHIATRIST TO PRESCRIBE VYVANSE

## 2024-10-04 NOTE — TELEPHONE ENCOUNTER
Hub can read and schedule:    Left message for patient to call back and schedule with a nurse pradelphine

## 2024-10-08 ENCOUNTER — OFFICE VISIT (OUTPATIENT)
Dept: INTERNAL MEDICINE | Facility: CLINIC | Age: 29
End: 2024-10-08
Payer: MEDICAID

## 2024-10-08 VITALS
SYSTOLIC BLOOD PRESSURE: 114 MMHG | WEIGHT: 156.4 LBS | HEART RATE: 82 BPM | RESPIRATION RATE: 20 BRPM | DIASTOLIC BLOOD PRESSURE: 68 MMHG | OXYGEN SATURATION: 100 % | TEMPERATURE: 97.7 F | HEIGHT: 64 IN | BODY MASS INDEX: 26.7 KG/M2

## 2024-10-08 DIAGNOSIS — R94.31 ABNORMAL EKG: ICD-10-CM

## 2024-10-08 DIAGNOSIS — R41.840 ATTENTION OR CONCENTRATION DEFICIT: Primary | ICD-10-CM

## 2024-10-08 PROCEDURE — 1126F AMNT PAIN NOTED NONE PRSNT: CPT | Performed by: NURSE PRACTITIONER

## 2024-10-08 PROCEDURE — 1160F RVW MEDS BY RX/DR IN RCRD: CPT | Performed by: NURSE PRACTITIONER

## 2024-10-08 PROCEDURE — 99213 OFFICE O/P EST LOW 20 MIN: CPT | Performed by: NURSE PRACTITIONER

## 2024-10-08 PROCEDURE — 1159F MED LIST DOCD IN RCRD: CPT | Performed by: NURSE PRACTITIONER

## 2024-10-08 PROCEDURE — 93000 ELECTROCARDIOGRAM COMPLETE: CPT | Performed by: NURSE PRACTITIONER

## 2024-10-08 NOTE — PROGRESS NOTES
"Chief Complaint   Patient presents with    Need EKG    ADD       HPI  Marie Overton is a 29 y.o. female presents requesting an EKG.  She states that her behavioral health provider is wanting to start her on Vyvanse and they would like to see her EKG first.      The following portions of the patient's history were reviewed and updated as appropriate: allergies, current medications, past family history, past medical history, past social history, past surgical history, and problem list.    Subjective  Review of Systems   Constitutional:  Negative for activity change, appetite change and fatigue.   HENT:  Negative for congestion.    Respiratory:  Negative for cough and shortness of breath.    Cardiovascular:  Negative for chest pain and leg swelling.   Gastrointestinal:  Negative for abdominal pain.   Neurological:  Negative for dizziness, weakness and confusion.   Psychiatric/Behavioral:  Negative for behavioral problems and decreased concentration.        Objective  Visit Vitals  /68   Pulse 82   Temp 97.7 °F (36.5 °C) (Infrared)   Resp 20   Ht 162.6 cm (64\")   Wt 70.9 kg (156 lb 6.4 oz)   SpO2 100%   BMI 26.85 kg/m²        Physical Exam  Vitals and nursing note reviewed.   HENT:      Head: Normocephalic.   Eyes:      Pupils: Pupils are equal, round, and reactive to light.   Pulmonary:      Effort: Pulmonary effort is normal. No respiratory distress.   Skin:     General: Skin is warm and dry.      Capillary Refill: Capillary refill takes less than 2 seconds.   Neurological:      General: No focal deficit present.      Mental Status: She is alert and oriented to person, place, and time.      Gait: Gait is intact.   Psychiatric:         Attention and Perception: Attention normal.         Mood and Affect: Mood normal.         Behavior: Behavior normal.            ECG 12 Lead    Date/Time: 10/8/2024 3:35 PM  Performed by: Butch Najera APRN    Authorized by: Butch Najera APRN  Comparison: not compared " with previous ECG   Previous ECG: no previous ECG available  Rhythm: sinus rhythm  Rate: normal  Conduction: conduction normal  ST Segments: ST segments normal  QRS axis: normal  Other findings: T wave abnormality  Comments: Borderline EKG with T wave abnormality           Assessment and Plan  Diagnoses and all orders for this visit:    1. Attention or concentration deficit (Primary)  -     ECG 12 Lead    2. Abnormal EKG  -     Ambulatory Referral to Cardiology     Refer to cardio for clearance due to T-wave abnormality      Return for Next scheduled follow up.        Butch Najera, APRN

## 2024-10-18 RX ORDER — BUPROPION HYDROCHLORIDE 300 MG/1
300 TABLET ORAL DAILY
Qty: 90 TABLET | Refills: 1 | Status: SHIPPED | OUTPATIENT
Start: 2024-10-18

## 2024-12-03 LAB — GGT SERPL-CCNC: 23 U/L (ref 5–36)
